# Patient Record
Sex: FEMALE | Race: OTHER | HISPANIC OR LATINO
[De-identification: names, ages, dates, MRNs, and addresses within clinical notes are randomized per-mention and may not be internally consistent; named-entity substitution may affect disease eponyms.]

---

## 2017-08-22 ENCOUNTER — TRANSCRIPTION ENCOUNTER (OUTPATIENT)
Age: 57
End: 2017-08-22

## 2017-08-22 ENCOUNTER — EMERGENCY (EMERGENCY)
Facility: HOSPITAL | Age: 57
LOS: 1 days | Discharge: HOME | End: 2017-08-22
Attending: EMERGENCY MEDICINE | Admitting: EMERGENCY MEDICINE
Payer: MEDICAID

## 2017-08-22 VITALS
HEART RATE: 93 BPM | OXYGEN SATURATION: 96 % | RESPIRATION RATE: 18 BRPM | DIASTOLIC BLOOD PRESSURE: 95 MMHG | SYSTOLIC BLOOD PRESSURE: 155 MMHG | TEMPERATURE: 99 F

## 2017-08-22 VITALS
TEMPERATURE: 98 F | SYSTOLIC BLOOD PRESSURE: 145 MMHG | DIASTOLIC BLOOD PRESSURE: 89 MMHG | OXYGEN SATURATION: 98 % | HEART RATE: 85 BPM | RESPIRATION RATE: 20 BRPM

## 2017-08-22 LAB
ALBUMIN SERPL ELPH-MCNC: 3.5 G/DL — SIGNIFICANT CHANGE UP (ref 3.3–5)
ALP SERPL-CCNC: 113 U/L — SIGNIFICANT CHANGE UP (ref 40–120)
ALT FLD-CCNC: 21 U/L RC — SIGNIFICANT CHANGE UP (ref 10–45)
ANION GAP SERPL CALC-SCNC: 13 MMOL/L — SIGNIFICANT CHANGE UP (ref 5–17)
AST SERPL-CCNC: 14 U/L — SIGNIFICANT CHANGE UP (ref 10–40)
BASOPHILS # BLD AUTO: 0.1 K/UL — SIGNIFICANT CHANGE UP (ref 0–0.2)
BASOPHILS NFR BLD AUTO: 0.6 % — SIGNIFICANT CHANGE UP (ref 0–2)
BILIRUB SERPL-MCNC: 0.5 MG/DL — SIGNIFICANT CHANGE UP (ref 0.2–1.2)
BUN SERPL-MCNC: 9 MG/DL — SIGNIFICANT CHANGE UP (ref 7–23)
CALCIUM SERPL-MCNC: 9.1 MG/DL — SIGNIFICANT CHANGE UP (ref 8.4–10.5)
CHLORIDE SERPL-SCNC: 98 MMOL/L — SIGNIFICANT CHANGE UP (ref 96–108)
CO2 SERPL-SCNC: 26 MMOL/L — SIGNIFICANT CHANGE UP (ref 22–31)
CREAT SERPL-MCNC: 0.68 MG/DL — SIGNIFICANT CHANGE UP (ref 0.5–1.3)
EOSINOPHIL # BLD AUTO: 0 K/UL — SIGNIFICANT CHANGE UP (ref 0–0.5)
EOSINOPHIL NFR BLD AUTO: 0.3 % — SIGNIFICANT CHANGE UP (ref 0–6)
GLUCOSE SERPL-MCNC: 266 MG/DL — HIGH (ref 70–99)
HCT VFR BLD CALC: 47.1 % — HIGH (ref 34.5–45)
HGB BLD-MCNC: 14.8 G/DL — SIGNIFICANT CHANGE UP (ref 11.5–15.5)
LYMPHOCYTES # BLD AUTO: 2.4 K/UL — SIGNIFICANT CHANGE UP (ref 1–3.3)
LYMPHOCYTES # BLD AUTO: 26.7 % — SIGNIFICANT CHANGE UP (ref 13–44)
MCHC RBC-ENTMCNC: 28.8 PG — SIGNIFICANT CHANGE UP (ref 27–34)
MCHC RBC-ENTMCNC: 31.5 GM/DL — LOW (ref 32–36)
MCV RBC AUTO: 91.4 FL — SIGNIFICANT CHANGE UP (ref 80–100)
MONOCYTES # BLD AUTO: 0.7 K/UL — SIGNIFICANT CHANGE UP (ref 0–0.9)
MONOCYTES NFR BLD AUTO: 8 % — SIGNIFICANT CHANGE UP (ref 2–14)
NEUTROPHILS # BLD AUTO: 5.9 K/UL — SIGNIFICANT CHANGE UP (ref 1.8–7.4)
NEUTROPHILS NFR BLD AUTO: 64.3 % — SIGNIFICANT CHANGE UP (ref 43–77)
PLATELET # BLD AUTO: 187 K/UL — SIGNIFICANT CHANGE UP (ref 150–400)
POTASSIUM SERPL-MCNC: 4.3 MMOL/L — SIGNIFICANT CHANGE UP (ref 3.5–5.3)
POTASSIUM SERPL-SCNC: 4.3 MMOL/L — SIGNIFICANT CHANGE UP (ref 3.5–5.3)
PROT SERPL-MCNC: 7.4 G/DL — SIGNIFICANT CHANGE UP (ref 6–8.3)
RBC # BLD: 5.15 M/UL — SIGNIFICANT CHANGE UP (ref 3.8–5.2)
RBC # FLD: 11.4 % — SIGNIFICANT CHANGE UP (ref 10.3–14.5)
SODIUM SERPL-SCNC: 137 MMOL/L — SIGNIFICANT CHANGE UP (ref 135–145)
WBC # BLD: 9.1 K/UL — SIGNIFICANT CHANGE UP (ref 3.8–10.5)
WBC # FLD AUTO: 9.1 K/UL — SIGNIFICANT CHANGE UP (ref 3.8–10.5)

## 2017-08-22 PROCEDURE — 70491 CT SOFT TISSUE NECK W/DYE: CPT

## 2017-08-22 PROCEDURE — 96375 TX/PRO/DX INJ NEW DRUG ADDON: CPT | Mod: XU

## 2017-08-22 PROCEDURE — 99284 EMERGENCY DEPT VISIT MOD MDM: CPT | Mod: 25

## 2017-08-22 PROCEDURE — 80053 COMPREHEN METABOLIC PANEL: CPT

## 2017-08-22 PROCEDURE — 96374 THER/PROPH/DIAG INJ IV PUSH: CPT | Mod: XU

## 2017-08-22 PROCEDURE — 85027 COMPLETE CBC AUTOMATED: CPT

## 2017-08-22 PROCEDURE — 99284 EMERGENCY DEPT VISIT MOD MDM: CPT

## 2017-08-22 PROCEDURE — 70491 CT SOFT TISSUE NECK W/DYE: CPT | Mod: 26

## 2017-08-22 RX ORDER — ACETAMINOPHEN 500 MG
1000 TABLET ORAL ONCE
Qty: 0 | Refills: 0 | Status: COMPLETED | OUTPATIENT
Start: 2017-08-22 | End: 2017-08-22

## 2017-08-22 RX ORDER — AMOXICILLIN 250 MG/5ML
1 SUSPENSION, RECONSTITUTED, ORAL (ML) ORAL
Qty: 14 | Refills: 0 | OUTPATIENT
Start: 2017-08-22 | End: 2017-08-29

## 2017-08-22 RX ORDER — OXYCODONE HYDROCHLORIDE 5 MG/1
1 TABLET ORAL
Qty: 8 | Refills: 0 | OUTPATIENT
Start: 2017-08-22 | End: 2017-08-24

## 2017-08-22 RX ORDER — AMPICILLIN SODIUM AND SULBACTAM SODIUM 250; 125 MG/ML; MG/ML
3 INJECTION, POWDER, FOR SUSPENSION INTRAMUSCULAR; INTRAVENOUS ONCE
Qty: 0 | Refills: 0 | Status: COMPLETED | OUTPATIENT
Start: 2017-08-22 | End: 2017-08-22

## 2017-08-22 RX ADMIN — Medication 1000 MILLIGRAM(S): at 16:51

## 2017-08-22 RX ADMIN — Medication 400 MILLIGRAM(S): at 14:14

## 2017-08-22 RX ADMIN — AMPICILLIN SODIUM AND SULBACTAM SODIUM 200 GRAM(S): 250; 125 INJECTION, POWDER, FOR SUSPENSION INTRAMUSCULAR; INTRAVENOUS at 17:08

## 2017-08-22 NOTE — ED SUB INTERN NOTE - ENMT, MLM
Airway patent. Nasal mucosa clear. Mouth with normal mucosa. Area of erythema inferior to lateral aspect of left 1st molar. Significant swelling of left jaw with area of firmness. Area tender to palpation. No erythema or warmth. Ears normal bilaterally.

## 2017-08-22 NOTE — ED SUB INTERN NOTE - ENMT NEGATIVE STATEMENT, MLM
Ears: +ear pain, no hearing problems. Nose: no nasal congestion and no nasal drainage. Mouth/Throat: +dysphagia, no hoarseness

## 2017-08-22 NOTE — ED PROVIDER NOTE - ATTENDING CONTRIBUTION TO CARE
56 year old female L face swelling and dental pain for days. on exam L face swelling w some chin involvement. well appearing afebrile. will image to eval for abscess vs franchesca angina. d/w dental.

## 2017-08-22 NOTE — ED SUB INTERN NOTE - OBJECTIVE STATEMENT FT
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 The pt noticed slight jaw pain on her left side on Sunday; she said she wasn't doing anything specific at that point. The following day, the pain worsened and she started to have swelling of the left jaw. She also had chills yesterday, for which she took aleve. The pain and swelling both progressed until this morning, so she came to the ED. She now states she cannot fully open her mouth and has some difficulty swallowing. She notes pain on the entire left side of her face, from the midline over. She notes pain in her left ear and in the left side of her mouth. She also notes parathesias of her left arm and right leg. Denies fevers, dizziness, HA, CP, SOB, abdominal pain, N/V/diarrhea, dysuria. Takes no meds, does not have regular medical follow up.

## 2017-08-22 NOTE — CONSULT NOTE ADULT - SUBJECTIVE AND OBJECTIVE BOX
Patient is a 56y old  Female who presents with a chief complaint of "I am swollen since Sunday."    HPI: 57 yo female with mandibular left facial swelling.  The patient reports that she developed left facial swelling and pain starting Sunday 8/20/17. Monday, 8/21/17 she states she had chills. She Denies fevers, headache, cp, sob, abd pain, NVDC.       PAST MEDICAL & SURGICAL HISTORY:  No pertinent past medical history  No significant past surgical history    ( -  ) heart valve replacement  ( -  ) joint replacement  (  - ) pregnancy    MEDICATIONS  (STANDING):      Allergies    No Known Allergies    Intolerances      FAMILY HISTORY:  No pertinent family history in first degree relatives      *SOCIAL HISTORY: Denies tobacco use.    *Last Dental Visit:    Vital Signs Last 24 Hrs  T(C): 36.7 (22 Aug 2017 14:15), Max: 37.1 (22 Aug 2017 12:43)  T(F): 98 (22 Aug 2017 14:15), Max: 98.7 (22 Aug 2017 12:43)  HR: 85 (22 Aug 2017 14:15) (85 - 93)  BP: 145/89 (22 Aug 2017 14:15) (145/89 - 155/95)  BP(mean): --  RR: 20 (22 Aug 2017 14:15) (18 - 20)  SpO2: 98% (22 Aug 2017 14:15) (96% - 98%)    EOE:  TMJ (  - ) clicks                    (  -  ) pops                    (  -  ) crepitus             Mandible FROM             Facial bones and MOM grossly intact             ( -  ) trismus             ( -  ) LAD             ( + ) swelling             ( +  ) asymmetry             ( +  ) palpation             ( -  ) SOB             ( -  ) dysphagia             ( -  ) LOC    IOE:  permanent/primary/mixed dentition: grossly intact  multiple carious teeth            hard/soft palate:  ( -  ) palatal torus           tongue/FOM WNL           labial/buccal mucosa WNL           ( -  ) percussion           ( +  ) palpation           (  + ) swelling            (  + ) abscess           (  - ) sinus tract    Dentition present: Secondary dentition.   Mobility: Class 3 mobility #18,19, class 1 mobility #17, 16       LABS:                        14.8   9.1   )-----------( 187      ( 22 Aug 2017 14:17 )             47.1     08-22    137  |  98  |  9   ----------------------------<  266<H>  4.3   |  26  |  0.68    Ca    9.1      22 Aug 2017 14:17    TPro  7.4  /  Alb  3.5  /  TBili  0.5  /  DBili  x   /  AST  14  /  ALT  21  /  AlkPhos  113  08-22    WBC Count: 9.1 K/uL [3.8 - 10.5] (08-22 @ 14:17)  Platelet Count - Automated: 187 K/uL [150 - 400] (08-22 @ 14:17)        *DENTAL RADIOGRAPHS: 1 CBCT 1 Panoramic    RADIOLOGY & ADDITIONAL STUDIES:    *ASSESSMENT: Asymmetry, left side of face swelling. Periapical pathology noted on #18,19. Buccal fluctuant swelling observed in mandibular left vestibule from #17-19. Pain on palpation. No trismis/lad/sinus tract.     PROCEDURE:   Verbal and written consent given.  Anesthesia: 1 Carpule 2% Lidocaine 1:100k with epinephrine to ANIA. 0.5 Carpule Septocaine 4% 1:100k with epi to local infiltration. Patient responds well to anesthesia.   Treatment: 2cm incision made in the buccal vestibule apical to #18. Hemopurulence observed during blunt dissection. Area irrigated with copious saline. Penrose drain placed with 2 silk sutures. Hemostasis achieved. POIG.     RECOMMENDATIONS:  1) F/U with [facility] in 2 days (thursday 8/24/17) for drain removal.   2) Recommended acetaminophen/ibuprofen OTC prn pain to patient. Recommended antibiotics to ED for rx.  3 Dental F/U with outpatient dentist for comprehensive dental care.   4)  If any difficulty swallowing/breathing, fever occur, return to ER.     Wiliam Kurtz DMD pager [phone]  Jacki Saleh DDS   Oral Surgeon on call: Dr. Han

## 2017-08-22 NOTE — ED SUB INTERN NOTE - MEDICAL DECISION MAKING DETAILS
55 yo F pmh macroadenoma p/w left sided jaw pain and swelling x 2 days. Had one episode of chills yesterday. Left sided jaw swelling with tenderness. Suspect dental infection. CT maxillofacial w/ IV contrast and dental consult

## 2017-08-22 NOTE — ED SUB INTERN NOTE - NEUROLOGICAL, MLM
Alert and oriented, CN II-XII intact. Pt states sensation is decreased in left upper and right lower extremities.

## 2017-08-22 NOTE — ED ADULT NURSE NOTE - OBJECTIVE STATEMENT
Patient   is  alert  and  oriented  x3.  Color  is  good  and skin warm  to  touch.  Swelling and  redness noted to  face and  neck.  She  is  c/o toothache. She  has  been afebrile.

## 2017-08-22 NOTE — ED PROVIDER NOTE - OBJECTIVE STATEMENT
57 yo female with no PMHx p/w left facial swelling.  The patient reports that she developed left facial swelling and pain over the past few days.  Yesterday, symptoms associated with chills.  Denies fevers, headache, cp, sob, abd pain, NVDC.

## 2017-08-22 NOTE — ED PROVIDER NOTE - PLAN OF CARE
1.  Stay hydrated  2.  Take Motrin 600mgs every 6 hrs as needed for mild to moderate pain       Take Oxycodone 5mgs every 6 hrs as needed for severe pain.  Do Not drive or operate heavy machinary while taking this medication  3.  Take Amoxicillin 875mgs every 12 hrs x 7 days  4.  Follow up with your PMD in 2-3 days for further work up of elevated blood sugar levels       Follow up in Dental clinic on Thursday [phone]  5. Return to the ER for worsening pain, fevers/chills or any other concerning symptoms

## 2017-08-22 NOTE — ED PROVIDER NOTE - CARE PLAN
Principal Discharge DX:	Dental abscess  Instructions for follow-up, activity and diet:	1.  Stay hydrated  2.  Take Motrin 600mgs every 6 hrs as needed for mild to moderate pain       Take Oxycodone 5mgs every 6 hrs as needed for severe pain.  Do Not drive or operate heavy machinary while taking this medication  3.  Take Amoxicillin 875mgs every 12 hrs x 7 days  4.  Follow up with your PMD in 2-3 days for further work up of elevated blood sugar levels       Follow up in Dental clinic on Thursday [phone]  5. Return to the ER for worsening pain, fevers/chills or any other concerning symptoms Principal Discharge DX:	Dental abscess  Instructions for follow-up, activity and diet:	1.  Stay hydrated  2.  Take Motrin 600mgs every 6 hrs as needed for mild to moderate pain       Take Oxycodone 5mgs every 6 hrs as needed for severe pain.  Do Not drive or operate heavy machinary while taking this medication  3.  Take Amoxicillin 875mgs every 12 hrs x 7 days  4.  Follow up with your PMD in 2-3 days for further work up of elevated blood sugar levels       Follow up in Dental clinic on Thursday [phone]  5. Return to the ER for worsening pain, fevers/chills or any other concerning symptoms

## 2020-02-17 ENCOUNTER — EMERGENCY (EMERGENCY)
Facility: HOSPITAL | Age: 60
LOS: 1 days | Discharge: HOME | End: 2020-02-17
Attending: EMERGENCY MEDICINE
Payer: MEDICAID

## 2020-02-17 VITALS
HEIGHT: 67 IN | RESPIRATION RATE: 17 BRPM | SYSTOLIC BLOOD PRESSURE: 177 MMHG | WEIGHT: 160.06 LBS | TEMPERATURE: 98 F | OXYGEN SATURATION: 99 % | DIASTOLIC BLOOD PRESSURE: 106 MMHG | HEART RATE: 97 BPM

## 2020-02-17 VITALS
RESPIRATION RATE: 17 BRPM | HEART RATE: 88 BPM | DIASTOLIC BLOOD PRESSURE: 91 MMHG | OXYGEN SATURATION: 99 % | SYSTOLIC BLOOD PRESSURE: 142 MMHG | TEMPERATURE: 98 F

## 2020-02-17 DIAGNOSIS — F39 UNSPECIFIED MOOD [AFFECTIVE] DISORDER: ICD-10-CM

## 2020-02-17 DIAGNOSIS — F43.23 ADJUSTMENT DISORDER WITH MIXED ANXIETY AND DEPRESSED MOOD: ICD-10-CM

## 2020-02-17 LAB
ALBUMIN SERPL ELPH-MCNC: 3.8 G/DL — SIGNIFICANT CHANGE UP (ref 3.3–5)
ALP SERPL-CCNC: 101 U/L — SIGNIFICANT CHANGE UP (ref 40–120)
ALT FLD-CCNC: 19 U/L — SIGNIFICANT CHANGE UP (ref 10–45)
ANION GAP SERPL CALC-SCNC: 16 MMOL/L — SIGNIFICANT CHANGE UP (ref 5–17)
APAP SERPL-MCNC: <15 UG/ML — SIGNIFICANT CHANGE UP (ref 10–30)
AST SERPL-CCNC: 13 U/L — SIGNIFICANT CHANGE UP (ref 10–40)
BASOPHILS # BLD AUTO: 0.03 K/UL — SIGNIFICANT CHANGE UP (ref 0–0.2)
BASOPHILS NFR BLD AUTO: 0.5 % — SIGNIFICANT CHANGE UP (ref 0–2)
BILIRUB SERPL-MCNC: 0.1 MG/DL — LOW (ref 0.2–1.2)
BUN SERPL-MCNC: 13 MG/DL — SIGNIFICANT CHANGE UP (ref 7–23)
CALCIUM SERPL-MCNC: 9.3 MG/DL — SIGNIFICANT CHANGE UP (ref 8.4–10.5)
CHLORIDE SERPL-SCNC: 98 MMOL/L — SIGNIFICANT CHANGE UP (ref 96–108)
CO2 SERPL-SCNC: 20 MMOL/L — LOW (ref 22–31)
CREAT SERPL-MCNC: 0.52 MG/DL — SIGNIFICANT CHANGE UP (ref 0.5–1.3)
EOSINOPHIL # BLD AUTO: 0.04 K/UL — SIGNIFICANT CHANGE UP (ref 0–0.5)
EOSINOPHIL NFR BLD AUTO: 0.6 % — SIGNIFICANT CHANGE UP (ref 0–6)
ETHANOL SERPL-MCNC: SIGNIFICANT CHANGE UP MG/DL (ref 0–10)
GLUCOSE SERPL-MCNC: 365 MG/DL — HIGH (ref 70–99)
HCG UR QL: NEGATIVE — SIGNIFICANT CHANGE UP
HCT VFR BLD CALC: 46.5 % — HIGH (ref 34.5–45)
HGB BLD-MCNC: 15 G/DL — SIGNIFICANT CHANGE UP (ref 11.5–15.5)
IMM GRANULOCYTES NFR BLD AUTO: 0.5 % — SIGNIFICANT CHANGE UP (ref 0–1.5)
LYMPHOCYTES # BLD AUTO: 2.53 K/UL — SIGNIFICANT CHANGE UP (ref 1–3.3)
LYMPHOCYTES # BLD AUTO: 40 % — SIGNIFICANT CHANGE UP (ref 13–44)
MCHC RBC-ENTMCNC: 28.5 PG — SIGNIFICANT CHANGE UP (ref 27–34)
MCHC RBC-ENTMCNC: 32.3 GM/DL — SIGNIFICANT CHANGE UP (ref 32–36)
MCV RBC AUTO: 88.2 FL — SIGNIFICANT CHANGE UP (ref 80–100)
MONOCYTES # BLD AUTO: 0.45 K/UL — SIGNIFICANT CHANGE UP (ref 0–0.9)
MONOCYTES NFR BLD AUTO: 7.1 % — SIGNIFICANT CHANGE UP (ref 2–14)
NEUTROPHILS # BLD AUTO: 3.25 K/UL — SIGNIFICANT CHANGE UP (ref 1.8–7.4)
NEUTROPHILS NFR BLD AUTO: 51.3 % — SIGNIFICANT CHANGE UP (ref 43–77)
NRBC # BLD: 0 /100 WBCS — SIGNIFICANT CHANGE UP (ref 0–0)
PCP SPEC-MCNC: SIGNIFICANT CHANGE UP
PLATELET # BLD AUTO: 224 K/UL — SIGNIFICANT CHANGE UP (ref 150–400)
POTASSIUM SERPL-MCNC: 3.8 MMOL/L — SIGNIFICANT CHANGE UP (ref 3.5–5.3)
POTASSIUM SERPL-SCNC: 3.8 MMOL/L — SIGNIFICANT CHANGE UP (ref 3.5–5.3)
PROT SERPL-MCNC: 7 G/DL — SIGNIFICANT CHANGE UP (ref 6–8.3)
RBC # BLD: 5.27 M/UL — HIGH (ref 3.8–5.2)
RBC # FLD: 11.9 % — SIGNIFICANT CHANGE UP (ref 10.3–14.5)
SALICYLATES SERPL-MCNC: <2 MG/DL — LOW (ref 15–30)
SODIUM SERPL-SCNC: 134 MMOL/L — LOW (ref 135–145)
WBC # BLD: 6.33 K/UL — SIGNIFICANT CHANGE UP (ref 3.8–10.5)
WBC # FLD AUTO: 6.33 K/UL — SIGNIFICANT CHANGE UP (ref 3.8–10.5)

## 2020-02-17 PROCEDURE — 80307 DRUG TEST PRSMV CHEM ANLYZR: CPT

## 2020-02-17 PROCEDURE — 80053 COMPREHEN METABOLIC PANEL: CPT

## 2020-02-17 PROCEDURE — 90792 PSYCH DIAG EVAL W/MED SRVCS: CPT | Mod: GC

## 2020-02-17 PROCEDURE — 81025 URINE PREGNANCY TEST: CPT

## 2020-02-17 PROCEDURE — 99285 EMERGENCY DEPT VISIT HI MDM: CPT

## 2020-02-17 PROCEDURE — 85027 COMPLETE CBC AUTOMATED: CPT

## 2020-02-17 PROCEDURE — 93005 ELECTROCARDIOGRAM TRACING: CPT

## 2020-02-17 PROCEDURE — 99283 EMERGENCY DEPT VISIT LOW MDM: CPT

## 2020-02-17 PROCEDURE — 93010 ELECTROCARDIOGRAM REPORT: CPT

## 2020-02-17 RX ORDER — DIAZEPAM 5 MG
5 TABLET ORAL ONCE
Refills: 0 | Status: DISCONTINUED | OUTPATIENT
Start: 2020-02-17 | End: 2020-02-17

## 2020-02-17 RX ORDER — DIAZEPAM 5 MG
1 TABLET ORAL
Qty: 5 | Refills: 0
Start: 2020-02-17

## 2020-02-17 RX ADMIN — Medication 5 MILLIGRAM(S): at 15:06

## 2020-02-17 NOTE — ED PROVIDER NOTE - ATTENDING CONTRIBUTION TO CARE
Елена Araya MD - Attending Physician: I have personally seen and examined this patient. I have discussed the case with the ACP. I have reviewed all pertinent clinical information, including history, physical exam, plan and the ACP’s note and agree except as noted. See MDM

## 2020-02-17 NOTE — ED PROVIDER NOTE - NSFOLLOWUPINSTRUCTIONS_ED_ALL_ED_FT
May take valium every 8hours as needed for severe anxiety.  Medication may make you feel drowsy.  Follow up with psychiatry as directed.  Return to ER for any worsening or concerning symptoms.

## 2020-02-17 NOTE — ED BEHAVIORAL HEALTH ASSESSMENT NOTE - CASE SUMMARY
59 year old female, domiciled at home in [place] with  and son, , no prior psychiatric history (no history of treatment, hospitalization), no prior suicide attempts, presents to ED because of numbness of body in context of anxiety attack in the setting of recent psychosocial stressors including deportation of son.    I have seen and evaluated this patient myself. Chart, labs, meds reviewed. I agree with resident's assessment and plan. This is a 59-year-old HF pt, domiciled at home in [place] with  and son, , no prior psychiatric history (no history of treatment, hospitalization), no prior suicide attempts, presents to ED because of numbness of body in context of anxiety attack in the setting of recent psychosocial stressors including deportation of son.    I have seen and evaluated this patient myself. Chart, labs, meds reviewed. I agree with resident's assessment and plan.

## 2020-02-17 NOTE — ED PROVIDER NOTE - PROGRESS NOTE DETAILS
Medically cleared. ETOH neg. Psych consulted for SI Psych at bedside Patient seen and evaluated by psych.  recommended Valium 5  (5 pills) and psych information.  patient also seen by social work

## 2020-02-17 NOTE — ED PROVIDER NOTE - PATIENT PORTAL LINK FT
You can access the FollowMyHealth Patient Portal offered by [facility] by registering at the following website: [email or web address]. By joining [facility] [facility]’s FollowMyHealth portal, you will also be able to view your health information using other applications (apps) compatible with our system.

## 2020-02-17 NOTE — ED BEHAVIORAL HEALTH ASSESSMENT NOTE - NS ED BHA ED COURSE FOUR POINT RESTRAINTS IN ED YN
[Coronary Artery Disease] : coronary artery disease [Chronic Kidney Disease] : chronic kidney disease [Diabetes] : diabetes [(Patient denies any chest pain, claudication, dyspnea on exertion, orthopnea, palpitations or syncope)] : Patient denies any chest pain, claudication, dyspnea on exertion, orthopnea, palpitations or syncope [Aortic Stenosis] : no aortic stenosis [Atrial Fibrillation] : no atrial fibrillation [Recent Myocardial Infarction] : no recent myocardial infarction [Implantable Device/Pacemaker] : no implantable device/pacemaker [Asthma] : no asthma [COPD] : no COPD [Sleep Apnea] : no sleep apnea [Smoker] : not a smoker [Family Member] : no family member with adverse anesthesia reaction/sudden death [Self] : no previous adverse anesthesia reaction [Chronic Anticoagulation] : no chronic anticoagulation [FreeTextEntry1] : Arthroscopic Left knee [FreeTextEntry2] : February 15, 2019  [FreeTextEntry3] : Dr. Wesley  [FreeTextEntry4] : Pre OP torn meniscus\par  No

## 2020-02-17 NOTE — ED PROVIDER NOTE - NEUROLOGICAL GAIT WEIGHT BEARING
Patient seen walking to stretcher multiple times, however when asked to get up, patient threw herself to her knees.
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
## 2020-02-17 NOTE — ED BEHAVIORAL HEALTH ASSESSMENT NOTE - DESCRIPTION
Patient was seen for diffuse weakness and anxiety. CBC and CMP were not concerning (although glucose was 365). Negative urine toxicology and serum toxicology. no significant PMHx lives in [place] with  and son, has son who was deported to [facility] recently

## 2020-02-17 NOTE — ED BEHAVIORAL HEALTH ASSESSMENT NOTE - DETAILS
patient has son who accompanied her here Patient looked out window of second story a few weeks ago and considered jumping but called her son instead. diabetes discussed plan with ED

## 2020-02-17 NOTE — ED BEHAVIORAL HEALTH ASSESSMENT NOTE - SUMMARY
59 year old female, domiciled at home in [place] with  and son, , no prior psychiatric history (no history of treatment, hospitalization), no prior suicide attempts, presents to ED because of numbness of body in context of anxiety attack in the setting of recent psychosocial stressors including deportation of son.    Patient denies current suicidal ideation, intent, or plan. Patient is anxious about a variety of recent stressful life events. The deportation of her son has likely contributed to a worsening of mood and anxiety appears to manifest somatically in this patient. Prior suicidal ideation was not characterized by intent or plan and was primarily passive. Patient would likely benefit from inpatient psychiatric hospitalization but does not currently meet criteria for involuntary hospitalization. 59 year old female, domiciled at home in [place] with  and son, , no prior psychiatric history (no history of treatment, hospitalization), no prior suicide attempts, presents to ED because of numbness of body in context of anxiety attack in the setting of recent psychosocial stressors including deportation of son.    Patient denies current suicidal ideation, intent, or plan. Patient is anxious about a variety of recent stressful life events. The deportation of her son has likely contributed to a worsening of mood and anxiety appears to manifest somatically in this patient. Prior suicidal ideation was not characterized by intent or plan and was primarily passive. Patient would likely benefit from inpatient psychiatric hospitalization, which she declines, but does not currently meet criteria for involuntary hospitalization.

## 2020-02-17 NOTE — ED BEHAVIORAL HEALTH ASSESSMENT NOTE - SUICIDE PROTECTIVE FACTORS
Engaged in work or school/Responsibility to family and others/Supportive social network of family or friends

## 2020-02-17 NOTE — CHART NOTE - NSCHARTNOTEFT_GEN_A_CORE
received referral from psychiatrist who is requesting that [facility] provide patient with recourses for outpatient  mental health treatment. Patient evaluated by psych, found not to meet criteria for inpatient admission. LMSW reviewed patient’s chart.     met with patient at bedside. Patient is Citizen of [place] speaking, [employer] used to communicate with patient (ID# [id number]). Patient accompanied by her family – son and spouse who she provided verbal consent to be present during discussion. Patient lives with family in a private residence in [place]. Patient reports being independent with ADLS and ambulation prior to hospitalization. Patient with no homecare or outpatient services in place. Patient identified her spouse, Dao Ellison ([phone]) as emergency contact. Patient denies any concerns at home.    provided patient with list of outpatient  mental health providers. Patient verbalized appreciation. Patient denies any questions or concerns.  to remain available.

## 2020-02-17 NOTE — ED PROVIDER NOTE - NSFOLLOWUPCLINICS_GEN_ALL_ED_FT
[facility] Psychiatry  Psychiatry  [id number] [place] [address]  Phone: [phone]  Fax:   Follow Up Time:

## 2020-02-17 NOTE — ED BEHAVIORAL HEALTH ASSESSMENT NOTE - ADDITIONAL DETAILS ALL
Patient looked out window of second story a few weeks ago and considered jumping but called her son instead.

## 2020-02-17 NOTE — ED PROVIDER NOTE - CLINICAL SUMMARY MEDICAL DECISION MAKING FREE TEXT BOX
Елена Araya MD - Attending Physician: Pt here with anxiety, depression, SI. Ambulated to ED room from triage, no focal exam deficits, +SI on questioning here. Labs for medical clearance. Psych consult
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
## 2020-02-17 NOTE — ED ADULT NURSE NOTE - OBJECTIVE STATEMENT
59 year old female a/ox3 ambulatory with steady gait presenting to ed with increased anxiety and generalized weakness. patient states she has been under a lot of stress lately; she recently moved here from [facility], her daughter was stuck in [place] for a long time due to immigration issues; her son was recently deported and sent back to [facility] and was recently shot and killed. patient verbalizes feeling generalized weakness throughout body. no gross neuro deficits noted. patient verbalized feeling suicidal; patient placed on constant observation for safety and wanded by security.

## 2020-02-17 NOTE — ED BEHAVIORAL HEALTH ASSESSMENT NOTE - OTHER
unable to assess the voices that patient endorses sounds more like internal monologue rather than auditory hallucinations son was deported recently

## 2020-02-17 NOTE — ED ADULT NURSE REASSESSMENT NOTE - NS ED NURSE REASSESS COMMENT FT1
Received pt from [facility] at 1330; pt is calm and controlled, Stateless speaking only, very pleasant and smiling on approach and making needs known, VSS; as per [facility] psychiatric consult called and pt was placed on constant observation for suicidal ideation 2/2 recent death of son in [facility]; family is at bedside and psychiatry responded to room at 1430, as per Dr. Humeravic pt does not meet criteria for inpatient transfer, she will be given medication for anxiety and social work referrals prior to discharge from ED; continue to monitor.

## 2020-02-17 NOTE — ED PROVIDER NOTE - CONSTITUTIONAL, MLM
normal... Well appearing, awake, alert, oriented to person, place, time/situation and in no apparent distress.  Patient crying during exam.

## 2021-05-13 ENCOUNTER — APPOINTMENT (OUTPATIENT)
Dept: FAMILY MEDICINE | Facility: CLINIC | Age: 61
End: 2021-05-13
Payer: MEDICAID

## 2021-05-13 DIAGNOSIS — Z00.00 ENCOUNTER FOR GENERAL ADULT MEDICAL EXAMINATION W/OUT ABNORMAL FINDINGS: ICD-10-CM

## 2021-05-13 DIAGNOSIS — Z12.11 ENCOUNTER FOR SCREENING FOR MALIGNANT NEOPLASM OF COLON: ICD-10-CM

## 2021-05-13 PROCEDURE — 99072 ADDL SUPL MATRL&STAF TM PHE: CPT

## 2021-05-13 PROCEDURE — 99204 OFFICE O/P NEW MOD 45 MIN: CPT

## 2021-05-14 VITALS
RESPIRATION RATE: 18 BRPM | HEART RATE: 81 BPM | OXYGEN SATURATION: 98 % | TEMPERATURE: 98.1 F | SYSTOLIC BLOOD PRESSURE: 132 MMHG | DIASTOLIC BLOOD PRESSURE: 76 MMHG

## 2021-05-14 PROBLEM — Z00.00 HEALTHCARE MAINTENANCE: Status: ACTIVE | Noted: 2021-05-13

## 2021-05-14 PROBLEM — Z12.11 SCREENING FOR COLON CANCER: Status: ACTIVE | Noted: 2021-05-13

## 2021-05-14 NOTE — HISTORY OF PRESENT ILLNESS
[FreeTextEntry8] : 61 yo female presents to the office with her  to establish care and for a number of medical issues. the patient states that for the past two weeks, she's had a very painful and swollen "lump" next to her vagina, and it's become difficult to sit comfortably. she reports being in a monogamous relationship with her , and has no concerns for STDs. in addition, she is feeling very anxious and depression due to one of her son's being deported to [facility]. She states feeling nervous all of the time, and crying because she feels helpless. she states that her family is very close, and they are supportive, and they are all working together to try and bring him [place] to [place]. in addition, she was told in the past that she might have "a mass in her head, but then they told me I was okay, and now I'm confused sometimes and that makes me worried" the patient denies any headaches, dizziness, blurry vision, nausea/vomiting, or unsteady gait. 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

## 2021-05-14 NOTE — END OF VISIT
[Time Spent: ___ minutes] : I have spent [unfilled] minutes of time on the encounter. [FreeTextEntry3] : I personally discussed this patient with  the Nurse Practitioner at the time of the visit.  I agree with the findings and plan as documented in the Nurse Practitioner's note, unless noted below.\par

## 2021-05-14 NOTE — PHYSICAL EXAM
[Vulvar Mass ___ cm] : a [unfilled] ~Ucm vulvar mass [Normal Inguinal Nodes] : no inguinal lymphadenopathy [Normal Femoral Nodes] : no femoral lymphadenopathy [Coordination Grossly Intact] : coordination grossly intact [No Focal Deficits] : no focal deficits [Normal Gait] : normal gait [Speech Grossly Normal] : speech grossly normal [Alert and Oriented x3] : oriented to person, place, and time [Normal Mood] : the mood was normal [Normal] : affect was normal and insight and judgment were intact [de-identified] : sad/crying when speaking about son

## 2021-05-14 NOTE — REVIEW OF SYSTEMS
[Confusion] : confusion [Memory Loss] : memory loss [Anxiety] : anxiety [Depression] : depression [Negative] : Heme/Lymph [Headache] : no headache [Dizziness] : no dizziness [Fainting] : no fainting [Unsteady Walking] : no ataxia [Suicidal] : not suicidal [Insomnia] : no insomnia [FreeTextEntry8] : labial pain/mass

## 2021-06-03 ENCOUNTER — APPOINTMENT (OUTPATIENT)
Dept: FAMILY MEDICINE | Facility: CLINIC | Age: 61
End: 2021-06-03
Payer: MEDICAID

## 2021-06-03 DIAGNOSIS — E55.9 VITAMIN D DEFICIENCY, UNSPECIFIED: ICD-10-CM

## 2021-06-03 DIAGNOSIS — N75.1 ABSCESS OF BARTHOLIN'S GLAND: ICD-10-CM

## 2021-06-03 PROCEDURE — 99215 OFFICE O/P EST HI 40 MIN: CPT

## 2021-06-03 RX ORDER — ERGOCALCIFEROL 1.25 MG/1
1.25 MG CAPSULE, LIQUID FILLED ORAL
Qty: 12 | Refills: 0 | Status: ACTIVE | COMMUNITY
Start: 2021-06-03 | End: 1900-01-01

## 2021-06-03 RX ORDER — BLOOD SUGAR DIAGNOSTIC
STRIP MISCELLANEOUS
Qty: 1 | Refills: 2 | Status: ACTIVE | COMMUNITY
Start: 2021-06-03 | End: 1900-01-01

## 2021-06-03 RX ORDER — ISOPROPYL ALCOHOL 70 ML/100ML
SWAB TOPICAL
Qty: 1 | Refills: 2 | Status: ACTIVE | COMMUNITY
Start: 2021-06-03 | End: 1900-01-01

## 2021-06-03 RX ORDER — LANCETS 33 GAUGE
EACH MISCELLANEOUS
Qty: 1 | Refills: 2 | Status: ACTIVE | COMMUNITY
Start: 2021-06-03 | End: 1900-01-01

## 2021-06-03 RX ORDER — BLOOD-GLUCOSE METER
W/DEVICE EACH MISCELLANEOUS
Qty: 1 | Refills: 0 | Status: ACTIVE | COMMUNITY
Start: 2021-06-03 | End: 1900-01-01

## 2021-06-07 ENCOUNTER — NON-APPOINTMENT (OUTPATIENT)
Age: 61
End: 2021-06-07

## 2021-06-07 VITALS
HEART RATE: 79 BPM | OXYGEN SATURATION: 98 % | TEMPERATURE: 97.8 F | RESPIRATION RATE: 18 BRPM | SYSTOLIC BLOOD PRESSURE: 130 MMHG | DIASTOLIC BLOOD PRESSURE: 74 MMHG

## 2021-06-07 PROBLEM — E55.9 VITAMIN D INSUFFICIENCY: Status: ACTIVE | Noted: 2021-06-03

## 2021-06-07 PROBLEM — N75.1 BARTHOLIN'S GLAND ABSCESS: Status: ACTIVE | Noted: 2021-05-13

## 2021-06-07 NOTE — PHYSICAL EXAM
[Coordination Grossly Intact] : coordination grossly intact [No Focal Deficits] : no focal deficits [Normal Gait] : normal gait [Speech Grossly Normal] : speech grossly normal [Alert and Oriented x3] : oriented to person, place, and time [Normal] : affect was normal and insight and judgment were intact [de-identified] : tearful when speaking about son

## 2021-06-07 NOTE — HISTORY OF PRESENT ILLNESS
[FreeTextEntry1] : 61 yo female presents to the office for a f/u on labs\par  [de-identified] : The patient reports that she finished the antibiotics for the Bartholin's cyst, whch opened and drained on it's own. she reports that she has not started the sertraline yet, because the pharmacy did not have the medication available. There was a communication error at the last appointment, her son is in the [place], but is not a citizen, and is in danger of being deported to [facility], and this is a continuous stressor on the patient and her family.  Fasting labs also reviewed at length with patient and . \par \par  Willie # [id number] used during this call. \par

## 2021-06-07 NOTE — REVIEW OF SYSTEMS
[Insomnia] : insomnia [Anxiety] : anxiety [Depression] : depression [Negative] : Constitutional [Vision Problems] : no vision problems [Chest Pain] : no chest pain [Shortness Of Breath] : no shortness of breath [Frequency] : no frequency [Suicidal] : not suicidal

## 2021-07-27 ENCOUNTER — NON-APPOINTMENT (OUTPATIENT)
Age: 61
End: 2021-07-27

## 2021-08-11 ENCOUNTER — NON-APPOINTMENT (OUTPATIENT)
Age: 61
End: 2021-08-11

## 2021-09-15 ENCOUNTER — APPOINTMENT (OUTPATIENT)
Dept: FAMILY MEDICINE | Facility: CLINIC | Age: 61
End: 2021-09-15
Payer: MEDICAID

## 2021-09-15 VITALS
OXYGEN SATURATION: 97 % | SYSTOLIC BLOOD PRESSURE: 132 MMHG | TEMPERATURE: 97.5 F | HEART RATE: 83 BPM | RESPIRATION RATE: 16 BRPM | DIASTOLIC BLOOD PRESSURE: 78 MMHG

## 2021-09-15 DIAGNOSIS — E23.6 OTHER DISORDERS OF PITUITARY GLAND: ICD-10-CM

## 2021-09-15 DIAGNOSIS — M62.838 OTHER MUSCLE SPASM: ICD-10-CM

## 2021-09-15 LAB — HBA1C MFR BLD HPLC: 11.1

## 2021-09-15 PROCEDURE — 99214 OFFICE O/P EST MOD 30 MIN: CPT | Mod: 25

## 2021-09-15 PROCEDURE — 83036 HEMOGLOBIN GLYCOSYLATED A1C: CPT | Mod: QW

## 2021-09-15 RX ORDER — NAPROXEN SODIUM 550 MG/1
550 TABLET ORAL
Qty: 28 | Refills: 0 | Status: ACTIVE | COMMUNITY
Start: 2021-09-15 | End: 1900-01-01

## 2021-09-21 PROBLEM — M62.838 NECK MUSCLE SPASM: Status: ACTIVE | Noted: 2021-09-15

## 2021-09-21 RX ORDER — NAPROXEN SODIUM 220 MG/1
220 CAPSULE, LIQUID FILLED ORAL EVERY 8 HOURS
Qty: 1 | Refills: 3 | Status: ACTIVE | COMMUNITY
Start: 2021-09-21 | End: 1900-01-01

## 2021-09-21 NOTE — HISTORY OF PRESENT ILLNESS
[FreeTextEntry8] : 59 yo female with PMHx DM2 (metformin, jardiance, lisinopril) HLD (atorvastatin) presents to the office for neck pain and a headache. the patient states that for the past three days, she's had a headache 'on and off' and feels that her neck is very tight, which is causing the pain in the back of her head. she denies any blurry vision, nausea/vomiting, or visual changes.

## 2021-09-21 NOTE — REVIEW OF SYSTEMS
[Muscle Pain] : muscle pain [Headache] : headache [Negative] : Psychiatric [Joint Pain] : no joint pain [Joint Stiffness] : no joint stiffness [Joint Swelling] : no joint swelling [Muscle Weakness] : no muscle weakness [Back Pain] : no back pain [Dizziness] : no dizziness [Fainting] : no fainting [Confusion] : no confusion [Memory Loss] : no memory loss [Unsteady Walking] : no ataxia

## 2021-09-21 NOTE — PHYSICAL EXAM
[No Joint Swelling] : no joint swelling [Grossly Normal Strength/Tone] : grossly normal strength/tone [Coordination Grossly Intact] : coordination grossly intact [Normal Gait] : normal gait [No Focal Deficits] : no focal deficits [Speech Grossly Normal] : speech grossly normal [Alert and Oriented x3] : oriented to person, place, and time [Normal Mood] : the mood was normal [Normal] : affect was normal and insight and judgment were intact [de-identified] : muscle spasm to bilateral trapezius muscles

## 2021-11-02 RX ORDER — SERTRALINE HYDROCHLORIDE 50 MG/1
50 TABLET, FILM COATED ORAL
Qty: 30 | Refills: 2 | Status: COMPLETED | COMMUNITY
Start: 2021-05-14 | End: 2021-11-02

## 2021-12-07 ENCOUNTER — APPOINTMENT (OUTPATIENT)
Dept: FAMILY MEDICINE | Facility: CLINIC | Age: 61
End: 2021-12-07
Payer: MEDICAID

## 2021-12-07 VITALS
DIASTOLIC BLOOD PRESSURE: 80 MMHG | SYSTOLIC BLOOD PRESSURE: 120 MMHG | HEART RATE: 81 BPM | RESPIRATION RATE: 16 BRPM | OXYGEN SATURATION: 97 % | TEMPERATURE: 97.1 F

## 2021-12-07 DIAGNOSIS — R53.83 OTHER MALAISE: ICD-10-CM

## 2021-12-07 DIAGNOSIS — R53.81 OTHER MALAISE: ICD-10-CM

## 2021-12-07 LAB — HBA1C MFR BLD HPLC: 11.2

## 2021-12-07 PROCEDURE — 99215 OFFICE O/P EST HI 40 MIN: CPT | Mod: 25

## 2021-12-07 PROCEDURE — 83036 HEMOGLOBIN GLYCOSYLATED A1C: CPT | Mod: QW

## 2021-12-09 PROBLEM — R53.81 MALAISE AND FATIGUE: Status: ACTIVE | Noted: 2021-12-07

## 2021-12-09 NOTE — HISTORY OF PRESENT ILLNESS
[FreeTextEntry1] : 59 yo female with PMHx DM2 (metformin, jardiance, lisinopril) HLD (atorvastatin) anxiety/depression (sertraline) presents to the office for a f/u on labs\par  [de-identified] : The patient reports feeling well, has been trying to eat healthy, states that sometimes it is difficult. she feels 'much happier' on the sertraline, and states that her son is also staying in the country, which is a relief. \par \par  Willie # [id number] used during this call. \par

## 2021-12-09 NOTE — REVIEW OF SYSTEMS
[Fatigue] : fatigue [Negative] : Psychiatric [Fever] : no fever [Chills] : no chills [Hot Flashes] : no hot flashes [Night Sweats] : no night sweats [Recent Change In Weight] : ~T no recent weight change [Vision Problems] : no vision problems [Chest Pain] : no chest pain [Shortness Of Breath] : no shortness of breath

## 2021-12-09 NOTE — PHYSICAL EXAM
[No Lymphadenopathy] : no lymphadenopathy [Normal Supraclavicular Nodes] : no supraclavicular lymphadenopathy [Coordination Grossly Intact] : coordination grossly intact [No Focal Deficits] : no focal deficits [Normal Gait] : normal gait [Speech Grossly Normal] : speech grossly normal [Alert and Oriented x3] : oriented to person, place, and time [Normal Mood] : the mood was normal [Normal] : affect was normal and insight and judgment were intact

## 2022-01-11 ENCOUNTER — APPOINTMENT (OUTPATIENT)
Dept: FAMILY MEDICINE | Facility: CLINIC | Age: 62
End: 2022-01-11
Payer: MEDICAID

## 2022-01-11 VITALS
RESPIRATION RATE: 16 BRPM | HEART RATE: 78 BPM | DIASTOLIC BLOOD PRESSURE: 78 MMHG | TEMPERATURE: 96.2 F | OXYGEN SATURATION: 97 % | SYSTOLIC BLOOD PRESSURE: 130 MMHG

## 2022-01-11 DIAGNOSIS — Z23 ENCOUNTER FOR IMMUNIZATION: ICD-10-CM

## 2022-01-11 PROCEDURE — 99214 OFFICE O/P EST MOD 30 MIN: CPT

## 2022-01-13 NOTE — PHYSICAL EXAM
[Soft] : abdomen soft [Non Tender] : non-tender [Normal Bowel Sounds] : normal bowel sounds [Coordination Grossly Intact] : coordination grossly intact [No Focal Deficits] : no focal deficits [Normal Gait] : normal gait [Speech Grossly Normal] : speech grossly normal [Alert and Oriented x3] : oriented to person, place, and time [Normal Mood] : the mood was normal [Normal] : affect was normal and insight and judgment were intact

## 2022-01-13 NOTE — HISTORY OF PRESENT ILLNESS
[FreeTextEntry1] : 60 yo female with PMHx DM2 (metformin, jardiance) HTN (lisinopril) depression/anxiety (sertraline) HLD (atorvastatin) presents to the office for a follow up on glucose readings.  [de-identified] : the patient reports feeling well, states that she has not started trulicity because she does not want to give herself shots. she states tolerating metformin and jardiance without any adverse side effects. she has been checking her sugar "if she remembers" 1-2 times a day, states that the readings are 130s-140s. she has been trying to eat healthier foods, and reports an intentional weight loss. she also reports an improved mood on current dose of sertraline 50mg QD.

## 2022-03-03 RX ORDER — DULAGLUTIDE 0.75 MG/.5ML
0.75 INJECTION, SOLUTION SUBCUTANEOUS
Qty: 1 | Refills: 3 | Status: ACTIVE | COMMUNITY
Start: 2021-12-07 | End: 1900-01-01

## 2022-03-08 ENCOUNTER — APPOINTMENT (OUTPATIENT)
Dept: FAMILY MEDICINE | Facility: CLINIC | Age: 62
End: 2022-03-08
Payer: MEDICAID

## 2022-03-08 VITALS
DIASTOLIC BLOOD PRESSURE: 76 MMHG | SYSTOLIC BLOOD PRESSURE: 128 MMHG | HEART RATE: 74 BPM | RESPIRATION RATE: 16 BRPM | TEMPERATURE: 96.5 F | OXYGEN SATURATION: 97 %

## 2022-03-08 DIAGNOSIS — E11.65 TYPE 2 DIABETES MELLITUS WITH HYPERGLYCEMIA: ICD-10-CM

## 2022-03-08 PROCEDURE — 99214 OFFICE O/P EST MOD 30 MIN: CPT

## 2022-03-08 RX ORDER — ATORVASTATIN CALCIUM 10 MG/1
10 TABLET, FILM COATED ORAL
Qty: 90 | Refills: 2 | Status: ACTIVE | COMMUNITY
Start: 2021-06-03 | End: 1900-01-01

## 2022-03-08 RX ORDER — DOXYCYCLINE 100 MG/1
100 CAPSULE ORAL
Qty: 20 | Refills: 0 | Status: COMPLETED | COMMUNITY
Start: 2021-05-13 | End: 2022-03-08

## 2022-03-08 RX ORDER — SERTRALINE HYDROCHLORIDE 50 MG/1
50 TABLET, FILM COATED ORAL DAILY
Qty: 30 | Refills: 5 | Status: ACTIVE | COMMUNITY
Start: 2021-11-02 | End: 1900-01-01

## 2022-03-10 PROBLEM — E11.65 DIABETES MELLITUS TYPE 2, UNCONTROLLED: Status: ACTIVE | Noted: 2021-06-03

## 2022-03-10 NOTE — HISTORY OF PRESENT ILLNESS
[FreeTextEntry1] : 60 yo female with PMHx DM2 (metformin, jardiance) HTN (lisinopril) depression/anxiety (sertraline) HLD (atorvastatin) presents to the office for a follow up labs\par  [de-identified] : the patient reports feeling well, states that she is trying to eat healthier but finds it difficult at times. she did not start trulicity despite saying she would like to last visit, because she is too afraid of needles. states that she has been taking metformin twice a day, jardiace once a day. \par \par  #[id number] used during this visit

## 2022-03-10 NOTE — END OF VISIT
[FreeTextEntry3] : I, Dr. Menezes, personally performed the evaluation and management (E/M) services for this established patient who presents today with (a) new problem(s)/exacerbation of (an) existing condition(s). That E/M includes conducting the examination, assessing all new/exacerbated conditions, and establishing a new plan of care. Today, my nurse practitioner, Kristyn Han, was here to observe my evaluation and management services fort his new problem/exacerbated condition to be follow going forward.\par

## 2022-05-03 RX ORDER — METFORMIN HYDROCHLORIDE 1000 MG/1
1000 TABLET, COATED ORAL
Qty: 180 | Refills: 2 | Status: ACTIVE | COMMUNITY
Start: 2021-06-03 | End: 1900-01-01

## 2022-05-03 RX ORDER — LISINOPRIL 2.5 MG/1
2.5 TABLET ORAL DAILY
Qty: 90 | Refills: 2 | Status: ACTIVE | COMMUNITY
Start: 2021-06-03 | End: 1900-01-01

## 2022-05-04 ENCOUNTER — RX CHANGE (OUTPATIENT)
Age: 62
End: 2022-05-04

## 2022-05-04 RX ORDER — EMPAGLIFLOZIN 10 MG/1
10 TABLET, FILM COATED ORAL
Qty: 180 | Refills: 1 | Status: ACTIVE | COMMUNITY
Start: 2021-09-15 | End: 1900-01-01

## 2022-05-23 ENCOUNTER — APPOINTMENT (OUTPATIENT)
Dept: ENDOCRINOLOGY | Facility: CLINIC | Age: 62
End: 2022-05-23

## 2022-06-07 ENCOUNTER — APPOINTMENT (OUTPATIENT)
Dept: FAMILY MEDICINE | Facility: CLINIC | Age: 62
End: 2022-06-07
Payer: MEDICAID

## 2022-06-07 DIAGNOSIS — E11.9 TYPE 2 DIABETES MELLITUS W/OUT COMPLICATIONS: ICD-10-CM

## 2022-06-07 DIAGNOSIS — E78.2 MIXED HYPERLIPIDEMIA: ICD-10-CM

## 2022-06-07 DIAGNOSIS — F32.A ANXIETY DISORDER, UNSPECIFIED: ICD-10-CM

## 2022-06-07 DIAGNOSIS — F41.9 ANXIETY DISORDER, UNSPECIFIED: ICD-10-CM

## 2022-06-07 LAB — HBA1C MFR BLD HPLC: 13.2

## 2022-06-07 PROCEDURE — 99214 OFFICE O/P EST MOD 30 MIN: CPT | Mod: 25

## 2022-06-07 PROCEDURE — 83036 HEMOGLOBIN GLYCOSYLATED A1C: CPT | Mod: QW

## 2022-06-09 VITALS
HEART RATE: 78 BPM | TEMPERATURE: 97.9 F | RESPIRATION RATE: 16 BRPM | DIASTOLIC BLOOD PRESSURE: 74 MMHG | OXYGEN SATURATION: 98 % | SYSTOLIC BLOOD PRESSURE: 124 MMHG

## 2022-06-09 PROBLEM — E11.9 DIABETES MELLITUS, TYPE 2: Status: ACTIVE | Noted: 2022-06-07

## 2022-06-09 PROBLEM — E78.2 MIXED HYPERLIPIDEMIA: Status: ACTIVE | Noted: 2021-06-03

## 2022-06-09 PROBLEM — F41.9 ANXIETY AND DEPRESSION: Status: ACTIVE | Noted: 2021-05-13

## 2022-06-09 NOTE — HISTORY OF PRESENT ILLNESS
[FreeTextEntry1] : 62 yo female with PMHx DM2 (metformin, jardiance) HTN (lisinopril) depression/anxiety (sertraline) HLD (atorvastatin) presents to the office for a follow up labs\par  [de-identified] : the patient reports that she did not go to the endocrinologist appointment because "it was during the day, and i work" states that she is taking all of the prescribed medications without any adverse side effects "i think my sugar is better because I feel fine" admits to not changing her diet as much as she should.\par \par [place]  #[id number] used during this visit

## 2022-12-02 ENCOUNTER — EMERGENCY (EMERGENCY)
Facility: HOSPITAL | Age: 62
LOS: 1 days | Discharge: HOME | End: 2022-12-02
Attending: STUDENT IN AN ORGANIZED HEALTH CARE EDUCATION/TRAINING PROGRAM
Payer: MEDICAID

## 2022-12-02 VITALS
SYSTOLIC BLOOD PRESSURE: 156 MMHG | HEART RATE: 89 BPM | OXYGEN SATURATION: 96 % | TEMPERATURE: 98 F | RESPIRATION RATE: 18 BRPM | DIASTOLIC BLOOD PRESSURE: 86 MMHG | WEIGHT: 141.98 LBS | HEIGHT: 55 IN

## 2022-12-02 VITALS
HEART RATE: 81 BPM | DIASTOLIC BLOOD PRESSURE: 81 MMHG | RESPIRATION RATE: 18 BRPM | OXYGEN SATURATION: 98 % | SYSTOLIC BLOOD PRESSURE: 144 MMHG | TEMPERATURE: 98 F

## 2022-12-02 LAB
ALBUMIN SERPL ELPH-MCNC: 4 G/DL — SIGNIFICANT CHANGE UP (ref 3.3–5)
ALP SERPL-CCNC: 124 U/L — HIGH (ref 40–120)
ALT FLD-CCNC: 16 U/L — SIGNIFICANT CHANGE UP (ref 10–45)
ANION GAP SERPL CALC-SCNC: 10 MMOL/L — SIGNIFICANT CHANGE UP (ref 5–17)
APPEARANCE UR: ABNORMAL
AST SERPL-CCNC: 17 U/L — SIGNIFICANT CHANGE UP (ref 10–40)
BACTERIA # UR AUTO: ABNORMAL
BASOPHILS # BLD AUTO: 0.05 K/UL — SIGNIFICANT CHANGE UP (ref 0–0.2)
BASOPHILS NFR BLD AUTO: 0.6 % — SIGNIFICANT CHANGE UP (ref 0–2)
BILIRUB SERPL-MCNC: 0.3 MG/DL — SIGNIFICANT CHANGE UP (ref 0.2–1.2)
BILIRUB UR-MCNC: NEGATIVE — SIGNIFICANT CHANGE UP
BUN SERPL-MCNC: 14 MG/DL — SIGNIFICANT CHANGE UP (ref 7–23)
CALCIUM SERPL-MCNC: 9.7 MG/DL — SIGNIFICANT CHANGE UP (ref 8.4–10.5)
CHLORIDE SERPL-SCNC: 99 MMOL/L — SIGNIFICANT CHANGE UP (ref 96–108)
CO2 SERPL-SCNC: 27 MMOL/L — SIGNIFICANT CHANGE UP (ref 22–31)
COLOR SPEC: YELLOW — SIGNIFICANT CHANGE UP
CREAT SERPL-MCNC: 0.54 MG/DL — SIGNIFICANT CHANGE UP (ref 0.5–1.3)
DIFF PNL FLD: NEGATIVE — SIGNIFICANT CHANGE UP
EGFR: 105 ML/MIN/1.73M2 — SIGNIFICANT CHANGE UP
EOSINOPHIL # BLD AUTO: 0.11 K/UL — SIGNIFICANT CHANGE UP (ref 0–0.5)
EOSINOPHIL NFR BLD AUTO: 1.4 % — SIGNIFICANT CHANGE UP (ref 0–6)
EPI CELLS # UR: 13 /HPF — HIGH
FLUAV AG NPH QL: SIGNIFICANT CHANGE UP
FLUBV AG NPH QL: SIGNIFICANT CHANGE UP
GLUCOSE SERPL-MCNC: 260 MG/DL — HIGH (ref 70–99)
GLUCOSE UR QL: ABNORMAL
HCT VFR BLD CALC: 46.3 % — HIGH (ref 34.5–45)
HGB BLD-MCNC: 14.9 G/DL — SIGNIFICANT CHANGE UP (ref 11.5–15.5)
HYALINE CASTS # UR AUTO: 12 /LPF — HIGH (ref 0–2)
IMM GRANULOCYTES NFR BLD AUTO: 0.4 % — SIGNIFICANT CHANGE UP (ref 0–0.9)
KETONES UR-MCNC: ABNORMAL
LEUKOCYTE ESTERASE UR-ACNC: ABNORMAL
LYMPHOCYTES # BLD AUTO: 2.28 K/UL — SIGNIFICANT CHANGE UP (ref 1–3.3)
LYMPHOCYTES # BLD AUTO: 28.7 % — SIGNIFICANT CHANGE UP (ref 13–44)
MCHC RBC-ENTMCNC: 28.7 PG — SIGNIFICANT CHANGE UP (ref 27–34)
MCHC RBC-ENTMCNC: 32.2 GM/DL — SIGNIFICANT CHANGE UP (ref 32–36)
MCV RBC AUTO: 89 FL — SIGNIFICANT CHANGE UP (ref 80–100)
MONOCYTES # BLD AUTO: 0.65 K/UL — SIGNIFICANT CHANGE UP (ref 0–0.9)
MONOCYTES NFR BLD AUTO: 8.2 % — SIGNIFICANT CHANGE UP (ref 2–14)
NEUTROPHILS # BLD AUTO: 4.83 K/UL — SIGNIFICANT CHANGE UP (ref 1.8–7.4)
NEUTROPHILS NFR BLD AUTO: 60.7 % — SIGNIFICANT CHANGE UP (ref 43–77)
NITRITE UR-MCNC: NEGATIVE — SIGNIFICANT CHANGE UP
NRBC # BLD: 0 /100 WBCS — SIGNIFICANT CHANGE UP (ref 0–0)
PH UR: 6 — SIGNIFICANT CHANGE UP (ref 5–8)
PLATELET # BLD AUTO: 227 K/UL — SIGNIFICANT CHANGE UP (ref 150–400)
POTASSIUM SERPL-MCNC: 4.6 MMOL/L — SIGNIFICANT CHANGE UP (ref 3.5–5.3)
POTASSIUM SERPL-SCNC: 4.6 MMOL/L — SIGNIFICANT CHANGE UP (ref 3.5–5.3)
PROT SERPL-MCNC: 7.4 G/DL — SIGNIFICANT CHANGE UP (ref 6–8.3)
PROT UR-MCNC: ABNORMAL
RBC # BLD: 5.2 M/UL — SIGNIFICANT CHANGE UP (ref 3.8–5.2)
RBC # FLD: 12.2 % — SIGNIFICANT CHANGE UP (ref 10.3–14.5)
RBC CASTS # UR COMP ASSIST: 1 /HPF — SIGNIFICANT CHANGE UP (ref 0–4)
RSV RNA NPH QL NAA+NON-PROBE: SIGNIFICANT CHANGE UP
SARS-COV-2 RNA SPEC QL NAA+PROBE: SIGNIFICANT CHANGE UP
SODIUM SERPL-SCNC: 136 MMOL/L — SIGNIFICANT CHANGE UP (ref 135–145)
SP GR SPEC: 1.03 — HIGH (ref 1.01–1.02)
UROBILINOGEN FLD QL: NEGATIVE — SIGNIFICANT CHANGE UP
WBC # BLD: 7.95 K/UL — SIGNIFICANT CHANGE UP (ref 3.8–10.5)
WBC # FLD AUTO: 7.95 K/UL — SIGNIFICANT CHANGE UP (ref 3.8–10.5)
WBC UR QL: 118 /HPF — HIGH (ref 0–5)

## 2022-12-02 PROCEDURE — 85025 COMPLETE CBC W/AUTO DIFF WBC: CPT

## 2022-12-02 PROCEDURE — 99284 EMERGENCY DEPT VISIT MOD MDM: CPT

## 2022-12-02 PROCEDURE — 80053 COMPREHEN METABOLIC PANEL: CPT

## 2022-12-02 PROCEDURE — 99284 EMERGENCY DEPT VISIT MOD MDM: CPT | Mod: 25

## 2022-12-02 PROCEDURE — 96375 TX/PRO/DX INJ NEW DRUG ADDON: CPT

## 2022-12-02 PROCEDURE — 81001 URINALYSIS AUTO W/SCOPE: CPT

## 2022-12-02 PROCEDURE — 87637 SARSCOV2&INF A&B&RSV AMP PRB: CPT

## 2022-12-02 PROCEDURE — 96374 THER/PROPH/DIAG INJ IV PUSH: CPT

## 2022-12-02 PROCEDURE — 87086 URINE CULTURE/COLONY COUNT: CPT

## 2022-12-02 RX ORDER — VALACYCLOVIR 500 MG/1
1000 TABLET, FILM COATED ORAL ONCE
Refills: 0 | Status: COMPLETED | OUTPATIENT
Start: 2022-12-02 | End: 2022-12-02

## 2022-12-02 RX ORDER — CEFUROXIME AXETIL 250 MG
1 TABLET ORAL
Qty: 10 | Refills: 0
Start: 2022-12-02 | End: 2022-12-06

## 2022-12-02 RX ORDER — CEFTRIAXONE 500 MG/1
1000 INJECTION, POWDER, FOR SOLUTION INTRAMUSCULAR; INTRAVENOUS ONCE
Refills: 0 | Status: COMPLETED | OUTPATIENT
Start: 2022-12-02 | End: 2022-12-02

## 2022-12-02 RX ORDER — KETOROLAC TROMETHAMINE 30 MG/ML
15 SYRINGE (ML) INJECTION ONCE
Refills: 0 | Status: DISCONTINUED | OUTPATIENT
Start: 2022-12-02 | End: 2022-12-02

## 2022-12-02 RX ORDER — VALACYCLOVIR 500 MG/1
1 TABLET, FILM COATED ORAL
Qty: 21 | Refills: 0
Start: 2022-12-02 | End: 2022-12-08

## 2022-12-02 RX ADMIN — VALACYCLOVIR 1000 MILLIGRAM(S): 500 TABLET, FILM COATED ORAL at 22:05

## 2022-12-02 RX ADMIN — CEFTRIAXONE 100 MILLIGRAM(S): 500 INJECTION, POWDER, FOR SOLUTION INTRAMUSCULAR; INTRAVENOUS at 22:09

## 2022-12-02 RX ADMIN — Medication 15 MILLIGRAM(S): at 20:13

## 2022-12-02 NOTE — ED PROVIDER NOTE - PATIENT PORTAL LINK FT
You can access the FollowMyHealth Patient Portal offered by [facility] by registering at the following website: [email or web address]. By joining [employer]’s FollowMyHealth portal, you will also be able to view your health information using other applications (apps) compatible with our system.

## 2022-12-02 NOTE — ED ADULT TRIAGE NOTE - CHIEF COMPLAINT QUOTE
l flank pain, non-vesicular rash left side, denies having chicken pox in the past, no new foods or detergents

## 2022-12-02 NOTE — ED ADULT NURSE REASSESSMENT NOTE - NS ED NURSE REASSESS COMMENT FT1
Pt a&ox3, spontaneous respirations and equal chest rise. VSS on RA. Pt reports pain relief after receiving pain meds. She is to be D/C home. Pt agreeable to plan. PIV removed by this RN. D/C instructions reviewed with pt.

## 2022-12-02 NOTE — ED PROVIDER NOTE - OBJECTIVE STATEMENT
Patient is a 61 yo F DMII (not on medications as insurance does not cover), presents to [facility] ED for constant, L flank pain x 3 days. The pain is described as burning and achey. 10/10 in severity and worsened to 20/10 at 14:00 today. Also w/ associated rash & sores, cough, chills, dizziness. Took Advil last night which alleviated the pain enough to get her to sleep, but due to persisting pain she presents today. Up to date with vaccines, including childhood. No hx of chickenpox in past.

## 2022-12-02 NOTE — ED PROVIDER NOTE - ATTENDING APP SHARED VISIT CONTRIBUTION OF CARE
Attending (Kelvin Pan D.O.):  I have personally seen and examined this patient. I have performed a substantive portion of the visit including all aspects of the medical decision making. Resident, fellow, student, and/or ACP note reviewed. I agree on the plan of care except where noted.    see mdm

## 2022-12-02 NOTE — ED PROVIDER NOTE - NSFOLLOWUPINSTRUCTIONS_ED_ALL_ED_FT
YOU WERE SEEN IN THE ED FOR: rash    YOUR RASH IS CALLED SHINGLES. PLEASE SEE ATTACHED.    YOU WERE PRESCRIBED: valtrex  FOLLOW THE INSTRUCTIONS ON THE LABEL/CONTAINER    FOR PAIN/FEVER, YOU MAY TAKE TYLENOL (acetaminophen) AND/OR IBUPROFEN (advil or motrin). FOLLOW THE INSTRUCTIONS ON THE LABEL/CONTAINER.    PLEASE FOLLOW UP WITH YOUR PRIVATE PHYSICIAN WITHIN THE NEXT 48 HOURS. BRING COPIES OF YOUR RESULTS.    RETURN TO THE EMERGENCY DEPARTMENT IF YOU EXPERIENCE ANY NEW/CONCERNING/WORSENING SYMPTOMS.    PLEASE SEE ATTACHED ON: shingles

## 2022-12-02 NOTE — ED PROVIDER NOTE - CLINICAL SUMMARY MEDICAL DECISION MAKING FREE TEXT BOX
Attending (Kelvin Pan D.O.):   62-year-old female history of hypertension, diabetes, high cholesterol, anxiety/depression, here for 3 days of left flank pain with rash, feels internal but also superficial.  Denies trauma to the area.  Denies other GI symptoms, fever, chills, uterine or vaginal discharge.  Never had symptoms like this before.  Tried taking ibuprofen yesterday with improvement in pain.  Hemodynamically stable here.  Rash to the left L1 dermatome/ left flank region, papular, no crusting, negative Nikolsky sign. No CVA tenderness.  No abdominal tenderness, not peritoneal. Well hydrated.  Exam consistent for shingles w/o signs of dissemination.  CT a/p canceled as history and exam not consistent with acute renal/bowel patohlogy.  Will give Valtrex, have patient follow-up outpt. All discussed with patient at length. All q answered.

## 2022-12-02 NOTE — ED PROVIDER NOTE - RAPID ASSESSMENT
62y F that mainly speaks Latvian ( ID: [id number]) w/ PMHx of DMT2? presents to the ED c/o constant L flank pain radiating to pelvic area, rash w/ sores a/w chills starting 3 days ago. Denies taking medication due to insurance problems. Denies dysuria, urinary frequency, hematuria, fever. Pt is awake, alert and in no distress. Pt is well appearing in triage.    Marilin GOMEZ (Scribe) have documented this rapid assessment note under the dictation of Rika Espinoza) which has been reviewed and affirmed to be accurate. Patient was seen as a QDOC patient. 62y F that mainly speaks Tuvaluan ( ID: [id number]) w/ PMHx of DMT2? presents to the ED c/o constant L flank pain radiating to pelvic area, rash w/ sores a/w chills starting 3 days ago. Denies taking medication due to insurance problems. Denies dysuria, urinary frequency, hematuria, fever. Pt is awake, alert and in no distress. Pt is well appearing in triage.    IMarilin (Betina) have documented this rapid assessment note under the dictation of Rika Espinoza) which has been reviewed and affirmed to be accurate. Patient was seen as a QDOC patient.    Patient was rapidly assessed via qdoc encounter; a limited history and physical exam was performed. The patient will be seen and further worked up in the main emergency department and their care will be completed by the main emergency department team. Receiving team will follow up on labs, analgesia, any clinical imaging, and perform reassessment and disposition of the patient as clinically indicated. All decisions regarding the progression of care will be made at their discretion.  iain    The betina's documentation has been prepared under my direction and personally reviewed by me in its entirety. I confirm that the note above accurately reflects all work performed by me. - gilles

## 2022-12-02 NOTE — ED ADULT TRIAGE NOTE - PAIN RATING/NUMBER SCALE (0-10): REST
"Subjective:      Shantal Henry is a 7 y.o. female who presents with Conjunctivitis (x1wk States sister had the same thing and has been using sisters medication to treat pt.)        Historian is mother    HPIPinkeye for close to a week. No drainage. Sister had same symptoms and mom was using her cream on Shantal. No other symptoms.   Review of Systems   All other systems reviewed and are negative.         Objective:     /66   Pulse 106   Temp 36.4 °C (97.5 °F)   Resp 28   Ht 1.143 m (3' 9\")   Wt 21.2 kg (46 lb 12.8 oz)   SpO2 98%   BMI 16.25 kg/m²      Physical Exam   Constitutional: She appears well-developed.   HENT:   Right Ear: Tympanic membrane normal.   Left Ear: Tympanic membrane normal.   Nose: No nasal discharge.   Mouth/Throat: Mucous membranes are moist. Pharynx is normal.   Eyes: Pupils are equal, round, and reactive to light. Right eye exhibits discharge. Left eye exhibits discharge (edematous erythematous bilat ).   Neck: Normal range of motion.   Cardiovascular: Normal rate, regular rhythm, S1 normal and S2 normal.    Pulmonary/Chest: Effort normal and breath sounds normal. There is normal air entry.   Abdominal: Soft. Bowel sounds are normal.   Musculoskeletal: Normal range of motion.   Lymphadenopathy:     She has no cervical adenopathy.   Neurological: She is alert.   Skin: Skin is warm. Capillary refill takes less than 2 seconds.   Vitals reviewed.              Assessment/Plan:     1. Other mucopurulent conjunctivitis of both eyes  Polytrim drops added  Provided parent & patient with instructions on bacterial conjunctivitis. Instructed them to apply antibiotic gtts/ointment as prescribed, and to touch the tip of the applicator directly to the eye. Avoid touching the affected eye & then the unaffected eye. Recommend good hand washing as this is easily spread through contact. Advised patient if he/she wears contacts to avoid usage for 1 week, or until all symptoms resolve. " 0

## 2022-12-02 NOTE — ED ADULT TRIAGE NOTE - LANGUAGE ASSISTANCE NEEDED
October 31, 2022    Tasia Wheeler  [address]  [place] [address]             [facility]  Pediatrics  [address]  [place] [address]  Phone: [phone]  Fax: [phone]   October 31, 2022     Patient: Tasia Wheeler   YOB: 2007   Date of Visit: 10/31/2022       To Whom it May Concern:    Tasia Wheeler was seen in my clinic on 10/31/2022. She may return to school on 11/1/2022.    Please excuse her from any classes or work missed.    If you have any questions or concerns, please don't hesitate to call.    Sincerely,         Dolly Pop MD     
Yes-Patient/Caregiver accepts free interpretation services...

## 2022-12-02 NOTE — ED ADULT NURSE NOTE - OBJECTIVE STATEMENT
61 y/o female with hx of DM presents to ED with c/o worsening left flank pain that radiates to LLQ onset 3 days ago. Pt is A&Ox3, spontaneous respirations and equal chest rise. VSS on RA. Associated with the flank pain pain was a sudden onset of a red, itchy and painful rash that is localized to the left lower back and LLQ abdominal region. Pt denies fever, chills, CP, SOB, n/v/d, constipation. LBM yesterday and was normal. LLQ and left flank tender to palpation. Rash is painful to touch. Family at bedside. Bed locked and in lowest position and call bell within reach. Will continue to monitor.

## 2022-12-02 NOTE — ED PROVIDER NOTE - PHYSICAL EXAMINATION
Gen: Patient appears stated age; in no acute distress including pain.   Psych: Awake, Alert & Oriented x 3, Cooperative.  Skin: Multiple erythematous, confluent & linear plaques over left L1 distrubution overlying the ASIS & pelvis.   Head: Normocephalic & Atraumatic   Eyes: no injection or icterus  Neck: Symmetric, trachea midline. No thyromegaly.  Resp: unlabored breathing, clear to auscultation b/l. No wheezes, rales, or rhonchi.   Cardiac: Regular rate & rhythm, s1s2 heard, no murmurs, rubs or gallops  GI: Nondistended, soft. & nontender.   Ext: no edema, cyanosis, or clubbing.  Neuro: nonfocal

## 2022-12-02 NOTE — ED PROVIDER NOTE - CARE PROVIDER_API CALL
Kristyn Han (NP; RN)  NP in Primary Care AdultGerontology  [address] [place] [address]  Phone: [phone]  Fax: [phone]  Follow Up Time:

## 2022-12-04 LAB
CULTURE RESULTS: SIGNIFICANT CHANGE UP
SPECIMEN SOURCE: SIGNIFICANT CHANGE UP

## 2022-12-05 ENCOUNTER — APPOINTMENT (OUTPATIENT)
Dept: FAMILY MEDICINE | Facility: CLINIC | Age: 62
End: 2022-12-05

## 2023-03-26 NOTE — ED ADULT NURSE NOTE - NSICDXPASTMEDICALHX_GEN_ALL_CORE_FT
Pt states she reached up at work Wednesday and felt a pop to right side of ribs. Continues pain at this time.
PAST MEDICAL HISTORY:  No pertinent past medical history

## 2023-12-13 ENCOUNTER — EMERGENCY (EMERGENCY)
Facility: HOSPITAL | Age: 63
LOS: 1 days | Discharge: HOME | End: 2023-12-13
Attending: EMERGENCY MEDICINE
Payer: MEDICAID

## 2023-12-13 VITALS
WEIGHT: 169.98 LBS | TEMPERATURE: 98 F | HEIGHT: 63.78 IN | SYSTOLIC BLOOD PRESSURE: 169 MMHG | DIASTOLIC BLOOD PRESSURE: 111 MMHG | OXYGEN SATURATION: 97 % | HEART RATE: 98 BPM | RESPIRATION RATE: 16 BRPM

## 2023-12-13 VITALS
TEMPERATURE: 98 F | OXYGEN SATURATION: 97 % | DIASTOLIC BLOOD PRESSURE: 89 MMHG | RESPIRATION RATE: 16 BRPM | SYSTOLIC BLOOD PRESSURE: 150 MMHG | HEART RATE: 82 BPM

## 2023-12-13 PROCEDURE — 73562 X-RAY EXAM OF KNEE 3: CPT

## 2023-12-13 PROCEDURE — 71045 X-RAY EXAM CHEST 1 VIEW: CPT

## 2023-12-13 PROCEDURE — 90715 TDAP VACCINE 7 YRS/> IM: CPT

## 2023-12-13 PROCEDURE — 90471 IMMUNIZATION ADMIN: CPT

## 2023-12-13 PROCEDURE — 73562 X-RAY EXAM OF KNEE 3: CPT | Mod: 26,LT

## 2023-12-13 PROCEDURE — 73110 X-RAY EXAM OF WRIST: CPT

## 2023-12-13 PROCEDURE — 72170 X-RAY EXAM OF PELVIS: CPT | Mod: 26,XU

## 2023-12-13 PROCEDURE — 72170 X-RAY EXAM OF PELVIS: CPT

## 2023-12-13 PROCEDURE — 73110 X-RAY EXAM OF WRIST: CPT | Mod: 26,LT

## 2023-12-13 PROCEDURE — 99284 EMERGENCY DEPT VISIT MOD MDM: CPT | Mod: 25

## 2023-12-13 PROCEDURE — 73502 X-RAY EXAM HIP UNI 2-3 VIEWS: CPT

## 2023-12-13 PROCEDURE — 71045 X-RAY EXAM CHEST 1 VIEW: CPT | Mod: 26

## 2023-12-13 PROCEDURE — 73502 X-RAY EXAM HIP UNI 2-3 VIEWS: CPT | Mod: 26,LT

## 2023-12-13 PROCEDURE — 99284 EMERGENCY DEPT VISIT MOD MDM: CPT

## 2023-12-13 RX ORDER — TETANUS TOXOID, REDUCED DIPHTHERIA TOXOID AND ACELLULAR PERTUSSIS VACCINE, ADSORBED 5; 2.5; 8; 8; 2.5 [IU]/.5ML; [IU]/.5ML; UG/.5ML; UG/.5ML; UG/.5ML
0.5 SUSPENSION INTRAMUSCULAR ONCE
Refills: 0 | Status: COMPLETED | OUTPATIENT
Start: 2023-12-13 | End: 2023-12-13

## 2023-12-13 RX ORDER — ACETAMINOPHEN 500 MG
975 TABLET ORAL ONCE
Refills: 0 | Status: COMPLETED | OUTPATIENT
Start: 2023-12-13 | End: 2023-12-13

## 2023-12-13 RX ORDER — IBUPROFEN 200 MG
600 TABLET ORAL ONCE
Refills: 0 | Status: COMPLETED | OUTPATIENT
Start: 2023-12-13 | End: 2023-12-13

## 2023-12-13 RX ADMIN — Medication 975 MILLIGRAM(S): at 09:42

## 2023-12-13 RX ADMIN — TETANUS TOXOID, REDUCED DIPHTHERIA TOXOID AND ACELLULAR PERTUSSIS VACCINE, ADSORBED 0.5 MILLILITER(S): 5; 2.5; 8; 8; 2.5 SUSPENSION INTRAMUSCULAR at 09:53

## 2023-12-13 RX ADMIN — Medication 600 MILLIGRAM(S): at 09:41

## 2023-12-13 NOTE — ED ADULT NURSE NOTE - CHIEF COMPLAINT QUOTE
[facility] trip and fall this morning outside, neg head strike, denies LOC, denies HA, denies CP. [facility] trip and fall this morning outside, neg head strike, denies LOC, denies HA, denies CP.

## 2023-12-13 NOTE — ED PROVIDER NOTE - NSFOLLOWUPCLINICS_GEN_ALL_ED_FT
[facility] General Internal Medicine  General Internal Medicine  [address] [place] [address]  Phone: [phone]  Fax:      [facility] General Internal Medicine  General Internal Medicine  [address] [place] [address]  Phone: [phone]  Fax:

## 2023-12-13 NOTE — ED ADULT NURSE NOTE - OBJECTIVE STATEMENT
62 y/o female arrives to [facility] ED from home with c/o mechanical trip and fall. Pt states she tripped while walking outside this morning, denies head strike, denies LOC, denies blood thinner use. Denies CP and dizziness. Able to ambulate following fall. Patient is A&Ox4. Respirations spontaneous and unlabored. Denies SOB, CP, abdominal pain, n/v/d, urinary symptoms, fever/chills. Skin intact. 64 y/o female arrives to [facility] ED from home with c/o mechanical trip and fall. Pt states she tripped while walking outside this morning, denies head strike, denies LOC, denies blood thinner use. Denies CP and dizziness. Able to ambulate following fall. Patient is A&Ox4. Respirations spontaneous and unlabored. Denies SOB, CP, abdominal pain, n/v/d, urinary symptoms, fever/chills. Skin intact. 62 y/o female arrives to [facility] ED from home with c/o mechanical trip and fall. Pt states she tripped while walking outside this morning, denies head strike, denies LOC, denies blood thinner use. Denies CP and dizziness. Able to ambulate following fall. Endorsing R wrist, R hip, R knee pain. Patient is A&Ox4. Respirations spontaneous and unlabored. Denies SOB, CP, abdominal pain, n/v/d, urinary symptoms, fever/chills. Skin intact. 64 y/o female arrives to [facility] ED from home with c/o mechanical trip and fall. Pt states she tripped while walking outside this morning, denies head strike, denies LOC, denies blood thinner use. Denies CP and dizziness. Able to ambulate following fall. Endorsing R wrist, R hip, R knee pain. Patient is A&Ox4. Respirations spontaneous and unlabored. Denies SOB, CP, abdominal pain, n/v/d, urinary symptoms, fever/chills. Skin intact.

## 2023-12-13 NOTE — ED ADULT TRIAGE NOTE - CHIEF COMPLAINT QUOTE
[facility] trip and fall this morning outside, neg head strike, denies LOC, denies HA, denies CP. [facility] trip and fall this morning outside, neg head strike, denies LOC, denies HA, denies CP.

## 2023-12-13 NOTE — ED PROVIDER NOTE - PATIENT PORTAL LINK FT
You can access the FollowMyHealth Patient Portal offered by [facility] by registering at the following website: [email or web address]. By joining [employer]’s FollowMyHealth portal, you will also be able to view your health information using other applications (apps) compatible with our system. You can access the FollowMyHealth Patient Portal offered by [facility] by registering at the following website: [email or web address]. By joining [employer]’s FollowMyHealth portal, you will also be able to view your health information using other applications (apps) compatible with our system.

## 2023-12-13 NOTE — ED PROVIDER NOTE - MUSCULOSKELETAL, MLM
No midline spinal tenderness, NEXUS negative. + b/l chest wall tenderness without stop off or deformity.  Pelvis stable with mild tenderness over left hip.  Right UE's with full ROM and non tender throughout.  Left shoulder and elbow NT full ROM, left wrist tender + snuff box tenderness.  Left hand NT full ROM.  b/l LE's full ROM NT throughout save for tenderness over left hip.

## 2023-12-13 NOTE — ED PROVIDER NOTE - PROGRESS NOTE DETAILS
Xrays all unremarkable.  Pt reporting improvement in pain in the left wrist, reexamination reveals no tenderness including no snuff box tenderness.  Sg Xrays all unremarkable.  Pt reporting improvement in pain in the left wrist, reexamination reveals no tenderness including no snuff box tenderness.    Pt Pulling over 2000cc without pain on incentive spirometry  Sg

## 2023-12-13 NOTE — ED PROCEDURE NOTE - CPROC ED TIME OUT STATEMENT1
“Patient's name, , procedure and correct site were confirmed during the [place] Timeout.” “Patient's name, , procedure and correct site were confirmed during the [place] Timeout.”

## 2023-12-13 NOTE — ED PROVIDER NOTE - OBJECTIVE STATEMENT
Attending note.  Patient was seen in room #34 to the left.  Patient reports having a fall this morning complains of pain to both shoulders, both knees, left arm and left hand.  She also reports some lower back stiffness.  She did not strike her head.  She has no headache, dizziness, nausea, vomiting or neck pain.  She reports no chest or rib pain.  She has no numbness or paresthesia.

## 2023-12-13 NOTE — ED PROVIDER NOTE - PHYSICAL EXAMINATION
Attn - alert, nad, head - NC/AT, no facial tenderness, mandible and TMJ are NT, tongue - no trauma, PERRL 3 mm, nose - NT, no deformity or signs of epistaxis, neck/spine/back - Minimal tenderness paralumbar area without midline tenderness Chest/ribs - NT, Lungs - clear, good BS bilaterally without splinting, Cor - RR, no M, Abdo - soft, NT, ND, no HSM or tenderness, no ecchymosis or signs of trauma, no CVAT, Pelvis/hips - NT, and no pain with AROM.  UExt - FROM with mild pain, Mildly tender to shoulders bilaterally.  There is no deformities, swelling, ecchymosis, abrasions or signs of trauma, Examination of left wrist reveals no swelling, deformity, ecchymosis or signs of trauma.  There is tenderness at the scaphoid tubercle and radial side of the wrist.  Neurovascular examination is normal. LExt - hips - FROM without pain, Tender patella bilaterally with abrasions.  Extensor mechanisms are intact bilaterally.  Knees are stable when stressed., No edema uniformity or effusions.   Neuro - CN, motor, sensory, cerebellar, speech intact and non focal

## 2023-12-13 NOTE — ED PROVIDER NOTE - CLINICAL SUMMARY MEDICAL DECISION MAKING FREE TEXT BOX
Attending note.  Mechanical fall with multiple contusions and abrasions.  X-rays to rule out fracture with low suspicion.  Analgesia and reassess.

## 2023-12-13 NOTE — ED ADULT NURSE NOTE - NSFALLRISKINTERV_ED_ALL_ED
Communicate fall risk and risk factors to all staff, patient, and family/Provide visual cue: yellow wristband, yellow gown, etc/Reinforce activity limits and safety measures with patient and family/Call bell, personal items and telephone in reach/Instruct patient to call for assistance before getting out of bed/chair/stretcher/Non-slip footwear applied when patient is off stretcher/[place] to call system/Physically safe environment - no spills, clutter or unnecessary equipment/Purposeful Proactive Rounding/Room/bathroom lighting operational, light cord in reach Communicate fall risk and risk factors to all staff, patient, and family/Provide visual cue: yellow wristband, yellow gown, etc/Reinforce activity limits and safety measures with patient and family/Call bell, personal items and telephone in reach/Instruct patient to call for assistance before getting out of bed/chair/stretcher/Non-slip footwear applied when patient is off stretcher/[place] to call system/Physically safe environment - no spills, clutter or unnecessary equipment/Purposeful Proactive Rounding/Room/bathroom lighting operational, light cord in reach

## 2024-03-05 NOTE — ED ADULT NURSE NOTE - DISCHARGE TEACHING
Date of Service:  3/11/2024    Chief Complaint:  Elevated PSA follow-up     Independent Historian:  {NO YES:[id number]}    History of Present Illness:    'm seeing patient Lux Polanco at the request of José Luis Dejesus MD in consultation regarding an elevated PSA.  Lux Polanco is a 74 year old male who presented to his primary care physician for a routine physical and was found to have an elevated PSA up to  4.70 from 2021, his prior value was 3.98 from 3/4/2020.     Patient presents today with no fever, chills, dysuria or hematuria. Patient states he has a strong, stable stream, but does report nocturia x5. Denies any hematuria, UTIs or urolithiasis. He has never seen a urologist, has a family history of prostate cancer with his father diagnosed in his 50s treated with brachy and later  at age 86 after recurrence. Patient denies ever taking urological medications. Patient states he and his wife are not as sexually active as they would like to be due to little success with ability to hold and maintain an erection. He reports he tried Viagra in the past with success.      10/1/21 - patient returns for 6 week follow-up he has been on daily Cialis with improvement in his nocturia from 5 times per night to 1-2.  He has had some increase in fullness in his erections, but no rigidity satisfactory for penetration.  He tried an extra dose once but given the headache at the beginning of his daily regimen.  Repeat PSA has declined to 3.9, down from 4.72 months prior           22 - Patient returns for six-month follow-up.  His voiding symptoms are well controlled on daily Cialis, however he is not satisfied with his response with regards to his erections.  PSA has continued to decline, now 3.73 down from 3.90                22- Patient returns for 8 month follow-up. He is doing well. His urinary symptoms are in good control with daily Cialis. His has good erection with Sildenafil 40 mg on demand. His  PSA increased from baseline 3 to 5.98 from 12/9/22. His usual have blood draw in every 4 months due to diabetes. Today, PVR 6 cc.      12/18/23 - patient returns for annual follow-up.  PSA has decreased slightly from 6.67-4.76 with 19% free as of November 27, 2023.  He is compliant with daily Cialis as well as on demand Viagra with good control of his lower urinary tract symptoms and erectile dysfunction     3/11/24 - Patient returns for a 3 month follow-up. Prostate MRI from 1/17/24 revealed suspicious PI-RADS 4 lesion in the left peripheral posterolateral base, 10 x 6 mm. Findings of benign prostatic hyperplasia and prostatitis. Prostate volume is 74 cc. PSA with % free is ***    PSA Test Results:  Lab Results   Component Value Date    PSA 4.76 11/27/2023    PSA 6.62 (H) 04/14/2023    PSA 5.98 12/09/2022    PSA 3.73 03/30/2022    PSA 3.90 09/28/2021    PSA 4.70 (H) 07/21/2021    PSA 3.98 03/04/2020    PSA 3.22 02/25/2019    PSA 3.21 08/17/2017    PSA 2.6 11/13/2015       Past Medical History:   Past Medical History:   Diagnosis Date    Diabetes mellitus type II 4/3/2012    DM2 (diabetes mellitus, type 2) (CMD) 10/25/2013    HTN (hypertension)     Hypercholesterolemia     Type 2 diabetes mellitus (CMD)        Surgical History:  Past Surgical History:   Procedure Laterality Date    Anes arthroscopically aided acl repair/a      left knee    Occult blood test tube  03/22/2011   :    Family History:  Family History   Problem Relation Age of Onset    Cancer Mother         liver & pancreas    Osteoarthritis Mother     Gastrointestinal Mother     Hypertension Mother     Hyperlipidemia Mother     Cancer Father         prostate    Diabetes Father     Osteoarthritis Father     Hypertension Father     Hyperlipidemia Father     Other Father         memory loss    Osteoarthritis Sister     Osteoarthritis Brother     Diabetes Brother     Hypertension Brother     Hyperlipidemia Brother        Social History:  Social History      Socioeconomic History    Marital status: /Civil Union     Spouse name: Not on file    Number of children: Not on file    Years of education: Not on file    Highest education level: Not on file   Occupational History    Not on file   Tobacco Use    Smoking status: Former     Current packs/day: 0.00     Average packs/day: 1 pack/day for 20.0 years (20.0 ttl pk-yrs)     Types: Cigarettes     Start date: 11/10/1967     Quit date: 11/10/1987     Years since quittin.3    Smokeless tobacco: Never   Substance and Sexual Activity    Alcohol use: Yes     Alcohol/week: 4.0 standard drinks of alcohol     Types: 2 Cans of beer, 2 Shots of liquor per week    Drug use: Never    Sexual activity: Not on file   Other Topics Concern    Not on file   Social History Narrative    Not on file     Social Determinants of Health     Financial Resource Strain: Not on file   Food Insecurity: Not on file   Transportation Needs: Not on file   Physical Activity: Not on file   Stress: Not on file   Social Connections: Not on file   Interpersonal Safety: Not on file       Allergies:  ALLERGIES:   Allergen Reactions    Ace Inhibitors Other (See Comments)     Hyperkalemia    Indocin     Lipitor [Atorvastatin Calcium] Other (See Comments)     Rectal bleed.    Zocor [Simvastatin - High Dose] Other (See Comments)     Elevates cholesterol.        Medications:  Current Outpatient Medications   Medication Sig Dispense Refill    sildenafil (REVATIO) 20 MG tablet Take 1-5 tablets by mouth daily as needed for sexual activity. 60 tablet 0    hydroCHLOROthiazide (HYDRODIURIL) 12.5 MG tablet TAKE 1 TABLET BY MOUTH DAILY 90 tablet 3    metFORMIN (GLUCOPHAGE-XR) 500 MG 24 hr tablet TAKE 4 TABLETS BY MOUTH DAILY 360 tablet 3    tadalafil (CIALIS) 5 MG tablet Take 1 tablet by mouth daily. 90 tablet 2    blood glucose (OneTouch Verio) test strip Test blood sugar 3 times daily. Diagnosis: E11.9 . Meter: Onetouch Verio 300 strip 3    Insulin Pen Needle  (B-D U/F PEN NEEDLE 5/16\") 31G X 8 MM Misc Use to inject insulin 3 times daily. Remove needle cover(s) to expose needle before injecting. 300 each 3    Lancets (OneTouch Delica Plus [id number]) Misc 1 each  in the morning and 1 each at noon and 1 each in the evening. Test Blood glucose 3 times daily as directed 300 each 3    Insulin Lispro, 1 Unit Dial, (HumaLOG KwikPen) 100 UNIT/ML pen-injector Inject 15 Units into the skin daily before dinner. Prime 2 units before each dose. 15 mL 12    rosuvastatin (CRESTOR) 10 MG tablet TAKE 1 TABLET BY MOUTH DAILY 90 tablet 3    Lantus SoloStar 100 UNIT/ML pen-injector ADMINISTER 42 UNITS UNDER THE SKIN EVERY NIGHT AS DIRECTED 39 mL 3    blood glucose meter Test blood sugar two times daily as directed. Diagnosis: E11.9. Meter: One Touch Verio Flex. NPI [id number]. Patient uses insulin. 1 kit 0    Sulfacetamide Sodium-Sulfur 10-5 % Liquid Use to wash the face AM and PM, leave on 5 minutes then rinse. 170 g 1    clindamycin (CLEOCIN T) 1 % lotion Use to apply to the face in the AM 60 mL 1    metroNIDAZOLE (MetroCream) 0.75 % cream Use to apply to the face in the PM 45 g 1    aspirin 81 MG tablet Take 1 tablet by mouth daily.       No current facility-administered medications for this visit.       Allergies, Medications, Past Medical History, Past Surgical History, Family History, and Social History were reviewed today and changes are as noted above.    Physical Exam:  Constitutional:  There were no vitals taken for this visit.  Well developed, well nourished, and afebrile.    Eyes:  Conjunctivae and lids are normal.  Pupils and irises are equal and reactive to light.    Gastrointestinal:  Abdomen soft, nontender, nondistended.  No hepatosplenomegaly.  No hernias.    Skin:  Warm and dry with no lesions or induration.    Genitourinary:  {UROLOGY  exam:962426}    Record Review:    I met with patient, Lux Polanco, and obtained pertinent history and exam.    Tests Reviewed:   { Uro Tests:381134}  Tests Ordered:  { Uro Tests:666767}    The records were reviewed, as above.     Assessment and Plan:    Lux Polanco is a 74 year old male referred for rising PSA with nocturia and erectile dysfunction, PSA initially declined with management of BPH symptoms with daily Cialis and recent increase from baseline, Recent PSA 4.76. urinary and erectile dysfunction responding well to Cialis + Sildenafil 40 mg on demand.  Prostate MRI from 1/17/24 revealed suspicious PI-RADS 4 lesion in the left peripheral posterolateral base, 10 x 6 mm. Findings of benign prostatic hyperplasia and prostatitis. Prostate volume is 74 cc. PSA with % free is***      ***    MDM/Complexity Statement:  {JR MODERATE MDM (Level 4):761170}    {JR 10-19 (Level 2); 20-29 (Level 3); 30-39 (Level 4); 40+ (Level 5):735026::\" \"}   On 3/11/2024, Faye CHINO scribed the services personally performed by Garland Jennings MD.      d/carloz by PA

## 2024-04-28 NOTE — ED BEHAVIORAL HEALTH ASSESSMENT NOTE - REFERRAL / APPOINTMENT DETAILS
28-Apr-2024 15:35 f/u [facility] [place]

## 2024-06-28 ENCOUNTER — NON-APPOINTMENT (OUTPATIENT)
Age: 64
End: 2024-06-28

## 2024-11-04 ENCOUNTER — EMERGENCY (EMERGENCY)
Facility: HOSPITAL | Age: 64
LOS: 1 days | Discharge: HOME | End: 2024-11-04
Attending: STUDENT IN AN ORGANIZED HEALTH CARE EDUCATION/TRAINING PROGRAM
Payer: MEDICAID

## 2024-11-04 VITALS
TEMPERATURE: 98 F | OXYGEN SATURATION: 100 % | RESPIRATION RATE: 20 BRPM | SYSTOLIC BLOOD PRESSURE: 184 MMHG | HEIGHT: 65 IN | DIASTOLIC BLOOD PRESSURE: 111 MMHG | HEART RATE: 90 BPM | WEIGHT: 141.98 LBS

## 2024-11-04 LAB
ADD ON TEST-SPECIMEN IN LAB: SIGNIFICANT CHANGE UP
ALBUMIN SERPL ELPH-MCNC: 4.1 G/DL — SIGNIFICANT CHANGE UP (ref 3.3–5)
ALP SERPL-CCNC: 105 U/L — SIGNIFICANT CHANGE UP (ref 40–120)
ALT FLD-CCNC: 25 U/L — SIGNIFICANT CHANGE UP (ref 10–45)
ANION GAP SERPL CALC-SCNC: 13 MMOL/L — SIGNIFICANT CHANGE UP (ref 5–17)
APTT BLD: 29.2 SEC — SIGNIFICANT CHANGE UP (ref 24.5–35.6)
AST SERPL-CCNC: 23 U/L — SIGNIFICANT CHANGE UP (ref 10–40)
BASOPHILS # BLD AUTO: 0.04 K/UL — SIGNIFICANT CHANGE UP (ref 0–0.2)
BASOPHILS NFR BLD AUTO: 0.4 % — SIGNIFICANT CHANGE UP (ref 0–2)
BILIRUB SERPL-MCNC: 0.5 MG/DL — SIGNIFICANT CHANGE UP (ref 0.2–1.2)
BUN SERPL-MCNC: 13 MG/DL — SIGNIFICANT CHANGE UP (ref 7–23)
CALCIUM SERPL-MCNC: 9.3 MG/DL — SIGNIFICANT CHANGE UP (ref 8.4–10.5)
CHLORIDE SERPL-SCNC: 99 MMOL/L — SIGNIFICANT CHANGE UP (ref 96–108)
CO2 SERPL-SCNC: 24 MMOL/L — SIGNIFICANT CHANGE UP (ref 22–31)
CREAT SERPL-MCNC: 0.61 MG/DL — SIGNIFICANT CHANGE UP (ref 0.5–1.3)
EGFR: 102 ML/MIN/1.73M2 — SIGNIFICANT CHANGE UP
EOSINOPHIL # BLD AUTO: 0.09 K/UL — SIGNIFICANT CHANGE UP (ref 0–0.5)
EOSINOPHIL NFR BLD AUTO: 1 % — SIGNIFICANT CHANGE UP (ref 0–6)
GLUCOSE SERPL-MCNC: 299 MG/DL — HIGH (ref 70–99)
HCT VFR BLD CALC: 42.9 % — SIGNIFICANT CHANGE UP (ref 34.5–45)
HGB BLD-MCNC: 14.2 G/DL — SIGNIFICANT CHANGE UP (ref 11.5–15.5)
IMM GRANULOCYTES NFR BLD AUTO: 0.4 % — SIGNIFICANT CHANGE UP (ref 0–0.9)
INR BLD: 0.96 RATIO — SIGNIFICANT CHANGE UP (ref 0.85–1.16)
LYMPHOCYTES # BLD AUTO: 3.58 K/UL — HIGH (ref 1–3.3)
LYMPHOCYTES # BLD AUTO: 38.3 % — SIGNIFICANT CHANGE UP (ref 13–44)
MCHC RBC-ENTMCNC: 28.7 PG — SIGNIFICANT CHANGE UP (ref 27–34)
MCHC RBC-ENTMCNC: 33.1 G/DL — SIGNIFICANT CHANGE UP (ref 32–36)
MCV RBC AUTO: 86.7 FL — SIGNIFICANT CHANGE UP (ref 80–100)
MONOCYTES # BLD AUTO: 0.75 K/UL — SIGNIFICANT CHANGE UP (ref 0–0.9)
MONOCYTES NFR BLD AUTO: 8 % — SIGNIFICANT CHANGE UP (ref 2–14)
NEUTROPHILS # BLD AUTO: 4.84 K/UL — SIGNIFICANT CHANGE UP (ref 1.8–7.4)
NEUTROPHILS NFR BLD AUTO: 51.9 % — SIGNIFICANT CHANGE UP (ref 43–77)
NRBC # BLD: 0 /100 WBCS — SIGNIFICANT CHANGE UP (ref 0–0)
PLATELET # BLD AUTO: 232 K/UL — SIGNIFICANT CHANGE UP (ref 150–400)
POTASSIUM SERPL-MCNC: 4 MMOL/L — SIGNIFICANT CHANGE UP (ref 3.5–5.3)
POTASSIUM SERPL-SCNC: 4 MMOL/L — SIGNIFICANT CHANGE UP (ref 3.5–5.3)
PROT SERPL-MCNC: 7.5 G/DL — SIGNIFICANT CHANGE UP (ref 6–8.3)
PROTHROM AB SERPL-ACNC: 11 SEC — SIGNIFICANT CHANGE UP (ref 9.9–13.4)
RBC # BLD: 4.95 M/UL — SIGNIFICANT CHANGE UP (ref 3.8–5.2)
RBC # FLD: 12 % — SIGNIFICANT CHANGE UP (ref 10.3–14.5)
SODIUM SERPL-SCNC: 136 MMOL/L — SIGNIFICANT CHANGE UP (ref 135–145)
TROPONIN T, HIGH SENSITIVITY RESULT: 7 NG/L — SIGNIFICANT CHANGE UP (ref 0–51)
WBC # BLD: 9.34 K/UL — SIGNIFICANT CHANGE UP (ref 3.8–10.5)
WBC # FLD AUTO: 9.34 K/UL — SIGNIFICANT CHANGE UP (ref 3.8–10.5)

## 2024-11-04 PROCEDURE — 70498 CT ANGIOGRAPHY NECK: CPT | Mod: 26,MC

## 2024-11-04 PROCEDURE — 99285 EMERGENCY DEPT VISIT HI MDM: CPT

## 2024-11-04 PROCEDURE — 70496 CT ANGIOGRAPHY HEAD: CPT | Mod: 26,MC

## 2024-11-04 PROCEDURE — 70450 CT HEAD/BRAIN W/O DYE: CPT | Mod: 26,MC,59

## 2024-11-04 RX ORDER — AMOXICILLIN AND CLAVULANATE POTASSIUM 600; 42.9 MG/5ML; MG/5ML
1 POWDER, FOR SUSPENSION ORAL ONCE
Refills: 0 | Status: COMPLETED | OUTPATIENT
Start: 2024-11-04 | End: 2024-11-04

## 2024-11-04 RX ORDER — ONDANSETRON HYDROCHLORIDE 2 MG/ML
4 INJECTION, SOLUTION INTRAMUSCULAR; INTRAVENOUS ONCE
Refills: 0 | Status: COMPLETED | OUTPATIENT
Start: 2024-11-04 | End: 2024-11-04

## 2024-11-04 RX ORDER — IBUPROFEN 200 MG
600 TABLET ORAL ONCE
Refills: 0 | Status: COMPLETED | OUTPATIENT
Start: 2024-11-04 | End: 2024-11-04

## 2024-11-04 RX ORDER — AMOXICILLIN AND CLAVULANATE POTASSIUM 600; 42.9 MG/5ML; MG/5ML
1 POWDER, FOR SUSPENSION ORAL
Qty: 14 | Refills: 0
Start: 2024-11-04 | End: 2024-11-10

## 2024-11-04 RX ORDER — SODIUM CHLORIDE 9 MG/ML
1000 INJECTION, SOLUTION INTRAMUSCULAR; INTRAVENOUS; SUBCUTANEOUS ONCE
Refills: 0 | Status: COMPLETED | OUTPATIENT
Start: 2024-11-04 | End: 2024-11-04

## 2024-11-04 RX ADMIN — ONDANSETRON HYDROCHLORIDE 4 MILLIGRAM(S): 2 INJECTION, SOLUTION INTRAMUSCULAR; INTRAVENOUS at 21:51

## 2024-11-04 RX ADMIN — AMOXICILLIN AND CLAVULANATE POTASSIUM 1 TABLET(S): 600; 42.9 POWDER, FOR SUSPENSION ORAL at 19:58

## 2024-11-04 RX ADMIN — SODIUM CHLORIDE 1000 MILLILITER(S): 9 INJECTION, SOLUTION INTRAMUSCULAR; INTRAVENOUS; SUBCUTANEOUS at 21:51

## 2024-11-04 RX ADMIN — Medication 600 MILLIGRAM(S): at 19:58

## 2024-11-04 NOTE — ED PROVIDER NOTE - ATTENDING CONTRIBUTION TO CARE
63-year-old female code stroke for headache and right ear pain, right face tingling and bilateral arm tingling.  Patient last known well was 8 PM yesterday.  Patient states that 1 week ago her brother .  She states that today she developed a headache she has chronic trouble moving the right lower extremity for 1 year.  Patient with no chest pain or shortness of breath no known history of kidney problems on arrival to the ER patient is hypertensive

## 2024-11-04 NOTE — ED PROVIDER NOTE - NSFOLLOWUPINSTRUCTIONS_ED_ALL_ED_FT
You were seen in the emergency department today. You likely have diabetes.   Please take antibiotic as prescribed.   We recommend that you return if you develop chest pain, abdominal pain, shortness of breath, lightheadedness, vomiting, leg swelling, fever, headache, slurred speech, weakness or blurry vision.     Please follow up with your primary care doctor over the next 5-7 days for further management of your symptoms and to review your bloodwork to ensure no additional tests, imaging or bloodwork, need to be performed. Today you were seen in the ED for evaluation of your symptoms.     A copy of your results are attached in this document below. We determined that you have an ear infection.     We sent a prescription to your pharmacy for antibiotics. Please take these as prescribed.     We also placed referrals for an endocrinologist and a primary care doctor. Please look out for a phone call from the hospital for help with setting up these appointments.     It is VERY important that you follow up with these doctors as high blood sugar without getting treatment can be dangerous.     SEEK IMMEDIATE MEDICAL CARE IF YOU HAVE ANY OF THE FOLLOWING SYMPTOMS: severe chest pain, difficulty breathing, fainting, fevers that persist despite taking medications over the counter, vomiting blood, dark or bloody stools, inability to tolerate eating and drinking food by mouth.

## 2024-11-04 NOTE — ED PROVIDER NOTE - CLINICAL SUMMARY MEDICAL DECISION MAKING FREE TEXT BOX
63-year-old female who presents to the emergency department with no medical history she reports that she has no fevers or chills.  She states that her brother  this past week after suffering a recurrent stroke.  Patient states that she has had 1 year of right leg pain.  She also notes that she has bilateral paresthesias in the arms and right face.  She also states that this has been off and on for the past 1 year.  She reports that she feels that she may have an ear infection because she has right ear pain she states she has been taking Tylenol for her symptoms with minimal improvement she reports that she has no vision changes no trouble speaking or swallowing.  She states that yesterday she had a headache on exam patient is awake alert oriented x 3 has clear lungs to auscultation bilaterally she has soft abdomen on HEENT exam she has moist mucous membranes she has no respiratory tenderness no pain when palpating the tragus.  She has no facial droop.  Tympanic membrane on the right is full with loss of cone of light reflex panic membrane on the left is intact with positive cone of light reflex.  Patient has intact strength bilaterally she reports subjective bilateral arm numbness and right face numbness compared to the left.  She has mild difficulty walking in the right leg for which she states has been going on for 1 year.  She states that she does not use a cane and would not like one.  Findings likely consistent with otits media was made a code stroke upon arrival by nursing however w/ further hx from pt w/  jaki [id number] likely c/w otitis media rather than TIA?CVA given chronicity of symptoms vs complex migraine

## 2024-11-04 NOTE — ED ADULT NURSE NOTE - OBJECTIVE STATEMENT
62 y/o F , AXOX4, with a PMH of, presents to the ED reporting R face and B/L upper extremity numbness that began at 8pm last night. Pt reports R leg weakness x 1 year. Pt with mild difficulty walking, shuffling. Pulse, motor and sensory functions intact in all extremities. Pt endorses R ear pain that radiates to the back of the head. Code stroke initiated 1838. Pt escorted to CT with RN. 20 G IV placed and labs obtained.  mgl. Pt with history of unmanaged diabetes. Pt denies chest pain, dyspnea, N/V, dizziness, vision changes or dysphagia. Pt speaking in complete sentences, strong and equal strength in all extremities, sensations intact, abd soft non tender non distended, no edema noted. Safety and comfort measures maintained.

## 2024-11-04 NOTE — ED ADULT TRIAGE NOTE - CHIEF COMPLAINT QUOTE
pt c/o "pain to back of head and rt side of face since last night aprox 8pm , no relief with advil. No vision changes/dizziness/weakness"  decreased sensation to rt face compared to left, decreased strength to rt hand grasp.

## 2024-11-04 NOTE — CONSULT NOTE ADULT - ATTENDING COMMENTS
11/5  presented with numbness  has known suprasellar mass likelyi adenoma  seen by nsx  quick outpatient f/u   Reynaldo Gonzales MD  Vascular Neurology  Office: [phone]

## 2024-11-04 NOTE — ED PROVIDER NOTE - PATIENT PORTAL LINK FT
You can access the FollowMyHealth Patient Portal offered by [facility] by registering at the following website: [email or web address]. By joining [facility]’s FollowMyHealth portal, you will also be able to view your health information using other applications (apps) compatible with our system.

## 2024-11-04 NOTE — ED PROVIDER NOTE - PROGRESS NOTE DETAILS
supracellar mass, will consult nsg pt now vomiting, will give antiemetic, and ivf discussed w/ pt her test results used med  ajki [id number] case endoresed to Dr. lopez Desiree PGY-2: reassessed patient, feeling well without any further episodes of nausea. able to tolerate PO. discussed with patient plan to follow up as outpatient, states she needs referrals for a PCP and endocrinologist. is agreeable with plan to be discharged, on abx for her acute otitis. no intervention per neurosurgery as the mass has been present for more than 10 years. placed expedited referral.

## 2024-11-05 VITALS
SYSTOLIC BLOOD PRESSURE: 144 MMHG | TEMPERATURE: 98 F | OXYGEN SATURATION: 98 % | RESPIRATION RATE: 18 BRPM | DIASTOLIC BLOOD PRESSURE: 91 MMHG | HEART RATE: 77 BPM

## 2024-11-05 LAB
A1C WITH ESTIMATED AVERAGE GLUCOSE RESULT: 12.5 % — HIGH (ref 4–5.6)
A1C WITH ESTIMATED AVERAGE GLUCOSE RESULT: 12.6 % — HIGH (ref 4–5.6)
ESTIMATED AVERAGE GLUCOSE: 312 MG/DL — HIGH (ref 68–114)
ESTIMATED AVERAGE GLUCOSE: 315 MG/DL — HIGH (ref 68–114)

## 2024-11-05 PROCEDURE — 85610 PROTHROMBIN TIME: CPT

## 2024-11-05 PROCEDURE — 96374 THER/PROPH/DIAG INJ IV PUSH: CPT | Mod: XU

## 2024-11-05 PROCEDURE — 36415 COLL VENOUS BLD VENIPUNCTURE: CPT

## 2024-11-05 PROCEDURE — 80053 COMPREHEN METABOLIC PANEL: CPT

## 2024-11-05 PROCEDURE — 86901 BLOOD TYPING SEROLOGIC RH(D): CPT

## 2024-11-05 PROCEDURE — 82330 ASSAY OF CALCIUM: CPT

## 2024-11-05 PROCEDURE — 70450 CT HEAD/BRAIN W/O DYE: CPT | Mod: MC

## 2024-11-05 PROCEDURE — 84295 ASSAY OF SERUM SODIUM: CPT

## 2024-11-05 PROCEDURE — 70496 CT ANGIOGRAPHY HEAD: CPT | Mod: MC

## 2024-11-05 PROCEDURE — 86850 RBC ANTIBODY SCREEN: CPT

## 2024-11-05 PROCEDURE — 82435 ASSAY OF BLOOD CHLORIDE: CPT

## 2024-11-05 PROCEDURE — 82962 GLUCOSE BLOOD TEST: CPT

## 2024-11-05 PROCEDURE — 84132 ASSAY OF SERUM POTASSIUM: CPT

## 2024-11-05 PROCEDURE — 70498 CT ANGIOGRAPHY NECK: CPT | Mod: MC

## 2024-11-05 PROCEDURE — 86900 BLOOD TYPING SEROLOGIC ABO: CPT

## 2024-11-05 PROCEDURE — 82947 ASSAY GLUCOSE BLOOD QUANT: CPT

## 2024-11-05 PROCEDURE — 85025 COMPLETE CBC W/AUTO DIFF WBC: CPT

## 2024-11-05 PROCEDURE — 85014 HEMATOCRIT: CPT

## 2024-11-05 PROCEDURE — 84484 ASSAY OF TROPONIN QUANT: CPT

## 2024-11-05 PROCEDURE — 83605 ASSAY OF LACTIC ACID: CPT

## 2024-11-05 PROCEDURE — 85018 HEMOGLOBIN: CPT

## 2024-11-05 PROCEDURE — 83036 HEMOGLOBIN GLYCOSYLATED A1C: CPT

## 2024-11-05 PROCEDURE — 85730 THROMBOPLASTIN TIME PARTIAL: CPT

## 2024-11-05 PROCEDURE — 82803 BLOOD GASES ANY COMBINATION: CPT

## 2024-11-05 PROCEDURE — 93005 ELECTROCARDIOGRAM TRACING: CPT

## 2024-11-05 PROCEDURE — 99285 EMERGENCY DEPT VISIT HI MDM: CPT | Mod: 25

## 2024-11-05 RX ORDER — AMOXICILLIN AND CLAVULANATE POTASSIUM 600; 42.9 MG/5ML; MG/5ML
875 POWDER, FOR SUSPENSION ORAL
Qty: 14 | Refills: 0
Start: 2024-11-05 | End: 2024-11-11

## 2024-11-05 NOTE — CONSULT NOTE ADULT - SUBJECTIVE AND OBJECTIVE BOX
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 Today she p/f code stroke, Rt sided HA/ear pain, a/w Rt facial/hand numbness. Denies hx of HAs, N/V, blurry or double vision. CTH w/ suprasellar mass stable from MRI in 2015, encroaching on chiasm and contacting ACAs/ICAs.     Exam: Intact, VFF. Comfortable, states HA improved and acute Sx resolved       --Anticoagulation:    =====================  PAST MEDICAL HISTORY   No pertinent past medical history      PAST SURGICAL HISTORY   No significant past surgical history      No Known Allergies      MEDICATIONS:  Antibiotics:    Neuro:    Other:      SOCIAL HISTORY:   Occupation:   Marital Status:     FAMILY HISTORY:  No pertinent family history in first degree relatives        ROS: Negative except per HPI    LABS:  PT/INR - ( 04 Nov 2024 18:41 )   PT: 11.0 sec;   INR: 0.96 ratio         PTT - ( 04 Nov 2024 18:41 )  PTT:29.2 sec                        14.2   9.34  )-----------( 232      ( 04 Nov 2024 18:41 )             42.9     11-04    136  |  99  |  13  ----------------------------<  299[H]  4.0   |  24  |  0.61    Ca    9.3      04 Nov 2024 18:41    TPro  7.5  /  Alb  4.1  /  TBili  0.5  /  DBili  x   /  AST  23  /  ALT  25  /  AlkPhos  105  11-04      
Neurology - Consult Note    -  Spectra: [id number] ([facility]), [phone] ([facility])  -    HPI: Patient BRANDIN WILSON is a 63y (1960) woman with a PMHx significant for HTN not on any meds presenting as a code stroke for right facial numbness with right sided HA and right hand numbness with right ear pain. Of note, brother passed away one week ago. Patient has been feeling right ear pain first followed by nonpositional headache. Poor historian. [place] like she has an ear infection.     NIHSS 1 for right facial numbness  preMRS:0  Not a tenecteplase candidate given outside time window.   Not a thrombectomy candidate given no LVO.       Review of Systems:    All other review of systems is negative unless indicated above.    Allergies:  No Known Allergies      PMHx/PSHx/Family Hx: As above, otherwise see below   No pertinent past medical history        Social Hx:  No current use of tobacco, alcohol, or illicit drugs      Medications:  MEDICATIONS  (STANDING):    MEDICATIONS  (PRN):      Vitals:  T(C): 36.8 (11-04-24 @ 18:18), Max: 36.8 (11-04-24 @ 18:18)  HR: 90 (11-04-24 @ 18:18) (90 - 90)  BP: 184/111 (11-04-24 @ 18:18) (184/111 - 184/111)  RR: 20 (11-04-24 @ 18:18) (20 - 20)  SpO2: 100% (11-04-24 @ 18:18) (100% - 100%)    Physical Examination:    Neurologic Exam:  Mental status - Awake, Alert, Oriented to person, place, and time. Speech fluent, repetition and naming intact. Follows simple and complex commands.    Cranial nerves - PERRLA, VFF, EOMI, face sensation (V1-V3) intact b/l except for right V2, facial strength intact without asymmetry b/l, hearing intact b/l, palate with symmetric elevation, trapezius  5/5 strength b/l, tongue midline on protrusion with full lateral movement    Motor - Normal bulk and tone throughout. No pronator drift.  Strength testing            Deltoid      Biceps      Triceps     Wrist Extension    Wrist Flexion     Interossei         R            5                 5               5                     5                              5                        5                 5  L             5                 5               5                     5                              5                        5                 5              Hip Flexion    Hip Extension    Knee Flexion    Knee Extension    Dorsiflexion    Plantar Flexion  R              5                           5                       5                           5                            5                          5  L              5                           5                        5                           5                            5                          5    Sensation - Light touch intact throughout    DTR's -  did not assess given focused neuro exam     Coordination - Finger to Nose intact b/l.     Gait and station - Cautious gait     Labs:                        14.2   9.34  )-----------( 232      ( 04 Nov 2024 18:41 )             42.9           CAPILLARY BLOOD GLUCOSE      POCT Blood Glucose.: 294 mg/dL (04 Nov 2024 18:26)          CSF:                  Radiology:

## 2024-11-05 NOTE — CONSULT NOTE ADULT - ASSESSMENT
BRANDIN WILSON is a 63y (1960) woman with a PMHx significant for HTN not on any meds presenting as a code stroke for right facial numbness with right sided HA and right hand numbness with right ear pain. Of note, brother passed away one week ago. Patient has been feeling right ear pain first followed by nonpositional headache. Poor historian. [place] like she has an ear infection.     NIHSS 1 for right facial numbness  preMRS:0  Not a tenecteplase candidate given outside time window.   Not a thrombectomy candidate given no LVO.     Impression: Right ear pain and headache with associated right sided sx of unclear etiology. Less likely acute ischemic stroke. Consider migraine with associated sensory phenomenon. R/o ear etiology as well.     Recommendations:   Ear pain work up   F/u nsg for macroadenoma   - Upon discharge, PT should F/U with Dr. Gonzales:  [phone] [address] [place] [address]     D/w stroke fellow under supervision of stroke attending 
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 Today she p/f code stroke, Rt sided HA/ear pain, a/w Rt facial/hand numbness. Denies hx of HAs, N/V, blurry or double vision. CTH w/ suprasellar mass stable from MRI in 2015, encroaching on chiasm and contacting ACAs/ICAs. Exam: Intact, VFF. Comfortable, states HA improved and acute Sx resolved   -No neurosurgical intervention indicated, given stable mass x10yrs + pt asymptomatic (acute presentation likely unrelated to mass)   -F/u outpatient w/ NSGY as needed  -F/u outpatient w/ neurology

## 2024-11-08 DIAGNOSIS — Z00.00 ENCOUNTER FOR GENERAL ADULT MEDICAL EXAMINATION W/OUT ABNORMAL FINDINGS: ICD-10-CM

## 2024-12-05 ENCOUNTER — APPOINTMENT (OUTPATIENT)
Dept: FAMILY MEDICINE | Facility: CLINIC | Age: 64
End: 2024-12-05
Payer: MEDICAID

## 2024-12-05 VITALS
TEMPERATURE: 97.2 F | BODY MASS INDEX: 26.68 KG/M2 | HEIGHT: 62 IN | SYSTOLIC BLOOD PRESSURE: 140 MMHG | DIASTOLIC BLOOD PRESSURE: 89 MMHG | OXYGEN SATURATION: 98 % | RESPIRATION RATE: 16 BRPM | WEIGHT: 145 LBS | HEART RATE: 97 BPM

## 2024-12-05 DIAGNOSIS — Z12.4 ENCOUNTER FOR SCREENING FOR MALIGNANT NEOPLASM OF CERVIX: ICD-10-CM

## 2024-12-05 DIAGNOSIS — R92.30 DENSE BREASTS, UNSPECIFIED: ICD-10-CM

## 2024-12-05 DIAGNOSIS — Z12.39 ENCOUNTER FOR OTHER SCREENING FOR MALIGNANT NEOPLASM OF BREAST: ICD-10-CM

## 2024-12-05 DIAGNOSIS — Z12.11 ENCOUNTER FOR SCREENING FOR MALIGNANT NEOPLASM OF COLON: ICD-10-CM

## 2024-12-05 DIAGNOSIS — E11.9 TYPE 2 DIABETES MELLITUS W/OUT COMPLICATIONS: ICD-10-CM

## 2024-12-05 DIAGNOSIS — Z00.00 ENCOUNTER FOR GENERAL ADULT MEDICAL EXAMINATION W/OUT ABNORMAL FINDINGS: ICD-10-CM

## 2024-12-05 DIAGNOSIS — E78.2 MIXED HYPERLIPIDEMIA: ICD-10-CM

## 2024-12-05 DIAGNOSIS — Z13.820 ENCOUNTER FOR SCREENING FOR OSTEOPOROSIS: ICD-10-CM

## 2024-12-05 PROCEDURE — 99396 PREV VISIT EST AGE 40-64: CPT

## 2024-12-09 PROBLEM — Z00.00 ENCOUNTER FOR PREVENTIVE HEALTH EXAMINATION: Status: ACTIVE | Noted: 2024-12-09

## 2025-05-23 ENCOUNTER — APPOINTMENT (OUTPATIENT)
Dept: FAMILY MEDICINE | Facility: CLINIC | Age: 65
End: 2025-05-23
Payer: COMMERCIAL

## 2025-05-23 VITALS
TEMPERATURE: 97.3 F | SYSTOLIC BLOOD PRESSURE: 150 MMHG | DIASTOLIC BLOOD PRESSURE: 90 MMHG | OXYGEN SATURATION: 98 % | HEIGHT: 62 IN | HEART RATE: 113 BPM | WEIGHT: 142 LBS | BODY MASS INDEX: 26.13 KG/M2 | RESPIRATION RATE: 16 BRPM

## 2025-05-23 VITALS — DIASTOLIC BLOOD PRESSURE: 88 MMHG | HEART RATE: 94 BPM | SYSTOLIC BLOOD PRESSURE: 144 MMHG

## 2025-05-23 DIAGNOSIS — R07.89 OTHER CHEST PAIN: ICD-10-CM

## 2025-05-23 DIAGNOSIS — I10 ESSENTIAL (PRIMARY) HYPERTENSION: ICD-10-CM

## 2025-05-23 DIAGNOSIS — E11.9 TYPE 2 DIABETES MELLITUS W/OUT COMPLICATIONS: ICD-10-CM

## 2025-05-23 DIAGNOSIS — E78.2 MIXED HYPERLIPIDEMIA: ICD-10-CM

## 2025-05-23 PROCEDURE — 99214 OFFICE O/P EST MOD 30 MIN: CPT

## 2025-05-23 PROCEDURE — G2211 COMPLEX E/M VISIT ADD ON: CPT

## 2025-05-28 PROBLEM — R07.89 ATYPICAL CHEST PAIN: Status: ACTIVE | Noted: 2025-05-23

## 2025-05-28 PROBLEM — I10 ESSENTIAL HYPERTENSION: Status: ACTIVE | Noted: 2025-05-28
